# Patient Record
Sex: MALE | Race: WHITE | NOT HISPANIC OR LATINO | URBAN - METROPOLITAN AREA
[De-identification: names, ages, dates, MRNs, and addresses within clinical notes are randomized per-mention and may not be internally consistent; named-entity substitution may affect disease eponyms.]

---

## 2022-10-19 ENCOUNTER — LAB (OUTPATIENT)
Dept: LAB | Facility: HOSPITAL | Age: 67
End: 2022-10-19

## 2022-10-19 ENCOUNTER — OFFICE VISIT (OUTPATIENT)
Dept: NEUROLOGY | Facility: CLINIC | Age: 67
End: 2022-10-19

## 2022-10-19 VITALS
HEIGHT: 70 IN | TEMPERATURE: 96.9 F | OXYGEN SATURATION: 98 % | WEIGHT: 244 LBS | SYSTOLIC BLOOD PRESSURE: 118 MMHG | BODY MASS INDEX: 34.93 KG/M2 | HEART RATE: 78 BPM | DIASTOLIC BLOOD PRESSURE: 64 MMHG

## 2022-10-19 DIAGNOSIS — R25.1 TREMOR: Primary | ICD-10-CM

## 2022-10-19 DIAGNOSIS — R25.1 TREMOR: ICD-10-CM

## 2022-10-19 PROBLEM — K43.2 INCISIONAL HERNIA, WITHOUT OBSTRUCTION OR GANGRENE: Status: ACTIVE | Noted: 2020-05-22

## 2022-10-19 PROBLEM — R10.9 ABDOMINAL PAIN: Status: ACTIVE | Noted: 2019-01-31

## 2022-10-19 PROBLEM — K43.6 INCARCERATED VENTRAL HERNIA: Status: ACTIVE | Noted: 2019-12-03

## 2022-10-19 PROBLEM — E11.9 TYPE 2 DIABETES MELLITUS: Status: ACTIVE | Noted: 2019-12-03

## 2022-10-19 PROCEDURE — 86063 ANTISTREPTOLYSIN O SCREEN: CPT

## 2022-10-19 PROCEDURE — 82746 ASSAY OF FOLIC ACID SERUM: CPT

## 2022-10-19 PROCEDURE — 36415 COLL VENOUS BLD VENIPUNCTURE: CPT

## 2022-10-19 PROCEDURE — 80053 COMPREHEN METABOLIC PANEL: CPT

## 2022-10-19 PROCEDURE — 85025 COMPLETE CBC W/AUTO DIFF WBC: CPT

## 2022-10-19 PROCEDURE — 84436 ASSAY OF TOTAL THYROXINE: CPT

## 2022-10-19 PROCEDURE — 84446 ASSAY OF VITAMIN E: CPT

## 2022-10-19 PROCEDURE — 82607 VITAMIN B-12: CPT

## 2022-10-19 PROCEDURE — 99204 OFFICE O/P NEW MOD 45 MIN: CPT | Performed by: NURSE PRACTITIONER

## 2022-10-19 PROCEDURE — 82390 ASSAY OF CERULOPLASMIN: CPT

## 2022-10-19 PROCEDURE — 84443 ASSAY THYROID STIM HORMONE: CPT

## 2022-10-19 RX ORDER — ESCITALOPRAM OXALATE 20 MG/1
20 TABLET ORAL DAILY
COMMUNITY
Start: 2022-08-24 | End: 2023-01-20

## 2022-10-19 RX ORDER — DIAZEPAM 10 MG/1
TABLET ORAL
COMMUNITY
Start: 2022-08-11

## 2022-10-19 RX ORDER — ARIPIPRAZOLE 10 MG/1
10 TABLET ORAL DAILY
COMMUNITY
Start: 2022-10-18 | End: 2022-10-19 | Stop reason: DRUGHIGH

## 2022-10-19 RX ORDER — ARIPIPRAZOLE 5 MG/1
5 TABLET ORAL DAILY
Qty: 30 TABLET | Refills: 1 | Status: SHIPPED | OUTPATIENT
Start: 2022-10-19 | End: 2023-01-20

## 2022-10-19 NOTE — PROGRESS NOTES
Neuro Office Visit      Encounter Date: 10/19/2022   Patient Name: Kashmir Moura  : 1955   MRN: [id number]   PCP: Dr Almeida  Chief Complaint:    Chief Complaint   Patient presents with   • Tremors     NP- tremors in Bilateral hands picked since 2022       History of Present Illness: Kashmir Moura is a 67 y.o. male who is here today in Neurology for tremor.    Tremor  Has always had mild bilat hand tremor.  tremor became severe in bilat hands and mouth after increasing dose of Abiligy in . L>R. Right hand dominate.  He can stop the tremor. No tremor at night. Anxiety can make it worse. Drinks 6 diet cokes a day for many years.   Has been on Lexapro for years. Has been on abilify for 2 years. Abilfy dose was increased 6 months ago. Taking Abilify for anxiety. Takes vaium 2-3 times a week. Tremor does not improve with valium.    Poor sleep quality due to self-cath.  Has dreams but does not act out his dreams. No trouble rolling over in bed. No slurred speech or choking on food. Slightly unsteady on his feet. No falls. Has chronic back stiffness. No visual or auditory hallucinations. Wife denies change in face. Denies micrographia. Tremor is the same at rest and w activity. Denies numbness in left arm.  .    Meds: abilify, lexapro, valium, metformin    PMH: bladder ca, etoh abuse, anxiety, depression, htn, sz, hypothyroidism, prostate can  FH: neg for tremor, Neg for PD  Subjective      Past Medical History:   Past Medical History:   Diagnosis Date   • Allergies    • Anxiety    • Bladder cancer (HCC)    • Bladder problem    • Depression    • Seizures (HCC)        Past Surgical History:   Past Surgical History:   Procedure Laterality Date   • HERNIA REPAIR  2020 [facility]   • [place] P[place]      [place] Dr[facility]       Family History:   Family History   Problem Relation Age of Onset   • Arthritis Mother    • Lung cancer Father    • Heart  disease Father    • Hypertension Father        Social History:   Social History     Socioeconomic History   • Marital status:    Tobacco Use   • Smoking status: Former     Types: Cigarettes     Quit date: 2010     Years since quittin.8   • Smokeless tobacco: Never   Vaping Use   • Vaping Use: Never used   Substance and Sexual Activity   • Alcohol use: Yes     Comment: Very rarely   • Drug use: Yes     Types: Marijuana     Comment: daily   • Sexual activity: Yes     Partners: Female     Comment: Lives with wife, Shwetha       Medications:     Current Outpatient Medications:   •  diazePAM (VALIUM) 10 MG tablet, TAKE 1 TABLET BY MOUTH EVERY DAY AS NEEDED FOR MUSCLE SPASM, Disp: , Rfl:   •  escitalopram (LEXAPRO) 20 MG tablet, Take 1 tablet by mouth Daily., Disp: , Rfl:   •  metFORMIN (GLUCOPHAGE) 1000 MG tablet, Take 1 tablet by mouth Daily., Disp: , Rfl:   •  ARIPiprazole (ABILIFY) 5 MG tablet, Take 1 tablet by mouth Daily., Disp: 30 tablet, Rfl: 1    Allergies:   No Known Allergies    PHQ-9 Total Score:     STEADI Fall Risk Assessment was completed, and patient is at MODERATE risk for falls. Assessment completed on:10/19/2022    Objective     Physical Exam:   Physical Exam  Eyes:      Pupils: Pupils are equal, round, and reactive to light.   Neurological:      Mental Status: He is oriented to person, place, and time.      Coordination: Finger-Nose-Finger Test abnormal. Romberg Test normal.      Gait: Gait is intact.      Deep Tendon Reflexes:      Reflex Scores:       Tricep reflexes are 2+ on the right side and 2+ on the left side.       Bicep reflexes are 2+ on the right side and 2+ on the left side.       Brachioradialis reflexes are 2+ on the right side and 2+ on the left side.       Patellar reflexes are 2+ on the right side and 2+ on the left side.       Achilles reflexes are 2+ on the right side and 2+ on the left side.  Psychiatric:         Speech: Speech normal.         Neurologic Exam  "    Mental Status   Oriented to person, place, and time.   Follows 3 step commands.   Attention: normal. Concentration: normal.   Speech: speech is normal   Level of consciousness: alert  Knowledge: consistent with education.   Normal comprehension.     Cranial Nerves     CN III, IV, VI   Pupils are equal, round, and reactive to light.  Right pupil: Accommodation: intact.   Left pupil: Accommodation: intact.   CN III: no CN III palsy  CN VI: no CN VI palsy  Nystagmus: none   Diplopia: none  Upgaze: normal  Downgaze: normal  Conjugate gaze: present    CN VII   Facial expression full, symmetric.     CN VIII   Hearing: intact    CN XII   CN XII normal.     Motor Exam   Muscle bulk: normal  Overall muscle tone: normal    Strength   Right biceps: 5/5  Left biceps: 5/5  Right triceps: 5/5  Left triceps: 5/5  Right interossei: 5/5  Left interossei: 5/5  Right quadriceps: 5/5  Left quadriceps: 5/5  Right anterior tibial: 5/5  Left anterior tibial: 5/5  Right posterior tibial: 5/5  Left posterior tibial: 5/5    Sensory Exam   Light touch normal.     Gait, Coordination, and Reflexes     Gait  Gait: normal    Coordination   Romberg: negative  Finger to nose coordination: abnormal    Tremor   Resting tremor: present  Intention tremor: present  Action tremor: absent    Reflexes   Right brachioradialis: 2+  Left brachioradialis: 2+  Right biceps: 2+  Left biceps: 2+  Right triceps: 2+  Left triceps: 2+  Right patellar: 2+  Left patellar: 2+  Right achilles: 2+  Left achilles: 2+  Right : 2+  Left : 2+Left forearm with rotational tremor and and flexing of hand w thumb and extended fingers touching. Right forearm with minimal tremor.  FNF test neg on right and only slight dysarthria on left. Talat arm swing. Gait is not shuffling. No micrographia.        Vital Signs:   Vitals:    10/19/22 1322   BP: 118/64   Pulse: 78   Temp: 96.9 °F (36.1 °C)   SpO2: 98%   Weight: 111 kg (244 lb)   Height: 177.8 cm (70\")     Body mass index " is 35.01 kg/m².          Assessment / Plan      Assessment/Plan:   Diagnoses and all orders for this visit:    1. Tremor (Primary)  -     Antistreptolysin O Screen; Future  -     Ceruloplasmin; Future  -     Vitamin E; Future  -     TSH; Future  -     T4; Future  -     Vitamin B12 & Folate; Future  -     CBC Auto Differential; Future  -     Comprehensive Metabolic Panel; Future    Other orders  -     ARIPiprazole (ABILIFY) 5 MG tablet; Take 1 tablet by mouth Daily.  Dispense: 30 tablet; Refill: 1       Discussed differential to include BSE, PD and TD due to meds.  Will slowly wean him off of Lexapro and decrease Abilify to 5mg.  Wean off of caffeine. May need to use valium more often. He is to call me in 3 weeks. If still with tremor will start Sinemet.    Patient Education:       Reviewed medications, potential side effects and signs and symptoms to report. Discussed risk versus benefits of treatment plan with patient and/or family-including medications, labs and radiology that may be ordered. Addressed questions and concerns during visit. Patient and/or family verbalized understanding and agree with plan. Instructed to call the office with any questions and report to ER with any life-threatening symptoms.     Follow Up:   Return in about 3 months (around 1/19/2023) for Recheck.    During this visit the following were done:  Labs Reviewed []    Labs Ordered [x]    Radiology Reports Reviewed []    Radiology Ordered []    PCP Records Reviewed [x]    Referring Provider Records Reviewed []    ER Records Reviewed []    Hospital Records Reviewed []    History Obtained From Family [x]    Radiology Images Reviewed []    Other Reviewed []    Records Requested []      Fidel Rubio, NANCY, APRN

## 2022-10-20 LAB
ALBUMIN SERPL-MCNC: 4.2 G/DL (ref 3.5–5.2)
ALBUMIN/GLOB SERPL: 1.3 G/DL
ALP SERPL-CCNC: 82 U/L (ref 39–117)
ALT SERPL W P-5'-P-CCNC: 16 U/L (ref 1–41)
ANION GAP SERPL CALCULATED.3IONS-SCNC: 12.4 MMOL/L (ref 5–15)
ASO AB SERPL-ACNC: NEGATIVE [IU]/ML
AST SERPL-CCNC: 18 U/L (ref 1–40)
BASOPHILS # BLD AUTO: 0.05 10*3/MM3 (ref 0–0.2)
BASOPHILS NFR BLD AUTO: 0.6 % (ref 0–1.5)
BILIRUB SERPL-MCNC: 0.5 MG/DL (ref 0–1.2)
BUN SERPL-MCNC: 22 MG/DL (ref 8–23)
BUN/CREAT SERPL: 16.8 (ref 7–25)
CALCIUM SPEC-SCNC: 9.3 MG/DL (ref 8.6–10.5)
CERULOPLASMIN SERPL-MCNC: 26 MG/DL (ref 16–31)
CHLORIDE SERPL-SCNC: 101 MMOL/L (ref 98–107)
CO2 SERPL-SCNC: 22.6 MMOL/L (ref 22–29)
CREAT SERPL-MCNC: 1.31 MG/DL (ref 0.76–1.27)
DEPRECATED RDW RBC AUTO: 41.2 FL (ref 37–54)
EGFRCR SERPLBLD CKD-EPI 2021: 59.3 ML/MIN/1.73
EOSINOPHIL # BLD AUTO: 0.14 10*3/MM3 (ref 0–0.4)
EOSINOPHIL NFR BLD AUTO: 1.7 % (ref 0.3–6.2)
ERYTHROCYTE [DISTWIDTH] IN BLOOD BY AUTOMATED COUNT: 12.6 % (ref 12.3–15.4)
FOLATE SERPL-MCNC: 5.82 NG/ML (ref 4.78–24.2)
GLOBULIN UR ELPH-MCNC: 3.2 GM/DL
GLUCOSE SERPL-MCNC: 119 MG/DL (ref 65–99)
HCT VFR BLD AUTO: 43.6 % (ref 37.5–51)
HGB BLD-MCNC: 15.1 G/DL (ref 13–17.7)
IMM GRANULOCYTES # BLD AUTO: 0.04 10*3/MM3 (ref 0–0.05)
IMM GRANULOCYTES NFR BLD AUTO: 0.5 % (ref 0–0.5)
LYMPHOCYTES # BLD AUTO: 2.44 10*3/MM3 (ref 0.7–3.1)
LYMPHOCYTES NFR BLD AUTO: 29.6 % (ref 19.6–45.3)
MCH RBC QN AUTO: 30.9 PG (ref 26.6–33)
MCHC RBC AUTO-ENTMCNC: 34.6 G/DL (ref 31.5–35.7)
MCV RBC AUTO: 89.2 FL (ref 79–97)
MONOCYTES # BLD AUTO: 0.62 10*3/MM3 (ref 0.1–0.9)
MONOCYTES NFR BLD AUTO: 7.5 % (ref 5–12)
NEUTROPHILS NFR BLD AUTO: 4.96 10*3/MM3 (ref 1.7–7)
NEUTROPHILS NFR BLD AUTO: 60.1 % (ref 42.7–76)
NRBC BLD AUTO-RTO: 0 /100 WBC (ref 0–0.2)
PLATELET # BLD AUTO: 167 10*3/MM3 (ref 140–450)
PMV BLD AUTO: 11.9 FL (ref 6–12)
POTASSIUM SERPL-SCNC: 4.3 MMOL/L (ref 3.5–5.2)
PROT SERPL-MCNC: 7.4 G/DL (ref 6–8.5)
RBC # BLD AUTO: 4.89 10*6/MM3 (ref 4.14–5.8)
SODIUM SERPL-SCNC: 136 MMOL/L (ref 136–145)
T4 SERPL-MCNC: 5.65 MCG/DL (ref 4.5–11.7)
TSH SERPL DL<=0.05 MIU/L-ACNC: 3.09 UIU/ML (ref 0.27–4.2)
VIT B12 BLD-MCNC: 598 PG/ML (ref 211–946)
WBC NRBC COR # BLD: 8.25 10*3/MM3 (ref 3.4–10.8)

## 2022-10-27 ENCOUNTER — TELEPHONE (OUTPATIENT)
Dept: NEUROLOGY | Facility: CLINIC | Age: 67
End: 2022-10-27

## 2022-10-27 LAB
A-TOCOPHEROL VIT E SERPL-MCNC: 10.7 MG/L (ref 9–29)
GAMMA TOCOPHEROL SERPL-MCNC: 1.1 MG/L (ref 0.5–4.9)

## 2022-10-27 NOTE — TELEPHONE ENCOUNTER
Called patient and left  with results.    ----- Message from Fidel Rubio DNP, APRN sent at 10/27/2022 12:59 PM EDT -----  Please notify pt all labs were normal with no concerning findings.

## 2022-11-30 ENCOUNTER — TELEPHONE (OUTPATIENT)
Dept: NEUROLOGY | Facility: CLINIC | Age: 67
End: 2022-11-30

## 2022-11-30 NOTE — TELEPHONE ENCOUNTER
Caller: MURIEL  Relationship: SELF  Best call back number: [phone]    What medications are you currently taking:   Current Outpatient Medications on File Prior to Visit   Medication Sig Dispense Refill   • ARIPiprazole (ABILIFY) 5 MG tablet Take 1 tablet by mouth Daily. 30 tablet 1   • diazePAM (VALIUM) 10 MG tablet TAKE 1 TABLET BY MOUTH EVERY DAY AS NEEDED FOR MUSCLE SPASM     • escitalopram (LEXAPRO) 20 MG tablet Take 1 tablet by mouth Daily.     • metFORMIN (GLUCOPHAGE) 1000 MG tablet Take 1 tablet by mouth Daily.       No current facility-administered medications on file prior to visit.            Which medication are you concerned about: LEXAPRO AND ABILIFY    What are your concerns: HE STATES HIS TREMORS HAVE DECREASED AND HE HAS BEEN OFF BOTH MEDICATIONS FOR ABOUT A WEEK NOW. HE STATES EVERYTHING IS GOING FINE.

## 2023-01-09 ENCOUNTER — TELEPHONE (OUTPATIENT)
Dept: NEUROLOGY | Facility: CLINIC | Age: 68
End: 2023-01-09
Payer: MEDICARE

## 2023-01-09 NOTE — TELEPHONE ENCOUNTER
Caller: SASCHA OLMOS     Relationship: WIFE, ON  [employer]     Best call back number: [phone]  UNTIL 4:30 TODAY    What is your medical concern? PATIENT'S WIFE CALLED, STATES THAT THE PATIENT WAS WEANED OFF ANTI-DEPRESSION MEDICATION FOR TARDIVE DYSKINESIA, THE TREMORS STOPPED BUT THE PATIENT IS VERY DEPRESSED AND NEEDS TO BE ON ANTI DEPRESSION MEDICATION.    WHAT CAN THE PATIENT TAKE THAT WILL NOT CAUSE TARDIVE DYSKINESIA?    PLEASE CALL AND ADVISE    THANK YOU    How long has this issue been going on? AT LEAST 1 MONTH    Is your provider already aware of this issue? YES    Have you been treated for this issue? YES

## 2023-01-09 NOTE — TELEPHONE ENCOUNTER
His PCP or psychiatrist will need to manage his medication for depression. If he is suicidal he needs to go to ER.

## 2023-01-10 NOTE — TELEPHONE ENCOUNTER
Called and spoke with patient's wife Shwetha and gave provider's note.  Had a conversation telling her that we cannot treat for the depression and that would have to be followed by his PCP office which he had just been to yesterday as stated by Shwetha.  She will call them for treatment and he will keep his appointment here on the 20th.     There is 1 Wet Read(s) to document.

## 2023-01-20 ENCOUNTER — OFFICE VISIT (OUTPATIENT)
Dept: NEUROLOGY | Facility: CLINIC | Age: 68
End: 2023-01-20
Payer: MEDICARE

## 2023-01-20 VITALS
TEMPERATURE: 97.3 F | OXYGEN SATURATION: 98 % | HEIGHT: 70 IN | SYSTOLIC BLOOD PRESSURE: 122 MMHG | BODY MASS INDEX: 34.13 KG/M2 | HEART RATE: 91 BPM | DIASTOLIC BLOOD PRESSURE: 86 MMHG | WEIGHT: 238.4 LBS

## 2023-01-20 DIAGNOSIS — R25.1 TREMOR: ICD-10-CM

## 2023-01-20 DIAGNOSIS — G24.01 TARDIVE DYSKINESIA: Primary | ICD-10-CM

## 2023-01-20 PROCEDURE — 99213 OFFICE O/P EST LOW 20 MIN: CPT | Performed by: NURSE PRACTITIONER

## 2023-01-20 RX ORDER — BUPROPION HYDROCHLORIDE 150 MG/1
150 TABLET ORAL DAILY
COMMUNITY
Start: 2023-01-10

## 2023-01-20 NOTE — PROGRESS NOTES
Neuro Office Visit      Encounter Date: 2023   Patient Name: Kashmir Moura  : 1955   MRN: [id number]   PCP: DR Almeida  Chief Complaint:    Chief Complaint   Patient presents with   • Tremors       History of Present Illness: Kashmir Moura is a 67 y.o. male who is here today in Neurology for tremor.    Last visit 10/19/2022 w [facility],  Decrease Abilify 5 daily. Wean off of lexapro  Use valium prn.    Pt called with update and was doing well off of meds. Wife called back to report increased depression. Referred to psych to manage depression meds.    Tremor  Tremor resolved off of abilify and Lexapro. No further symptoms. Depression now treated w wellbutrin and he is stable.    PH  Has always had mild bilat hand tremor. 2022 tremor became severe in bilat hands and mouth after increasing dose of Abiligy in . L>R. Right hand dominate.  He can stop the tremor. No tremor at night. Anxiety can make it worse. Drinks 6 diet cokes a day for many years.   Has been on Lexapro for years. Has been on abilify for 2 years. Abilfy dose was increased 6 months ago. Taking Abilify for anxiety. Takes vaium 2-3 times a week. Tremor does not improve with valium.     Poor sleep quality due to self-cath.  Has dreams but does not act out his dreams. No trouble rolling over in bed. No slurred speech or choking on food. Slightly unsteady on his feet. No falls. Has chronic back stiffness. No visual or auditory hallucinations. Wife denies change in face. Denies micrographia. Tremor is the same at rest and w activity. Denies numbness in left arm.  .     Meds: abilify, lexapro, valium, metformin     PMH: bladder ca, etoh abuse, anxiety, depression, htn, sz, hypothyroidism, prostate can  FH: neg for tremor, Neg for PD  Subjective      Past Medical History:   Past Medical History:   Diagnosis Date   • Allergies    • Anxiety    • Bladder cancer (HCC)    • Bladder problem    • Depression    • Seizures (HCC)        Past  Surgical History:   Past Surgical History:   Procedure Laterality Date   • HERNIA REPAIR  2020 [facility]   • [place]      [place] Dr[facility]the[facility]       Family History:   Family History   Problem Relation Age of Onset   • Arthritis Mother    • Lung cancer Father    • Heart disease Father    • Hypertension Father        Social History:   Social History     Socioeconomic History   • Marital status:    Tobacco Use   • Smoking status: Former     Types: Cigarettes     Quit date: 2010     Years since quittin.1   • Smokeless tobacco: Never   Vaping Use   • Vaping Use: Never used   Substance and Sexual Activity   • Alcohol use: Yes     Comment: Very rarely   • Drug use: Yes     Types: Marijuana     Comment: daily   • Sexual activity: Yes     Partners: Female     Comment: Lives with wife, Shwetha       Medications:     Current Outpatient Medications:   •  diazePAM (VALIUM) 10 MG tablet, TAKE 1 TABLET BY MOUTH EVERY DAY AS NEEDED FOR MUSCLE SPASM, Disp: , Rfl:   •  metFORMIN (GLUCOPHAGE) 1000 MG tablet, Take 1 tablet by mouth Daily., Disp: , Rfl:   •  buPROPion XL (WELLBUTRIN XL) 150 MG 24 hr tablet, Take 150 mg by mouth Daily., Disp: , Rfl:     Allergies:   No Known Allergies    PHQ-9 Total Score:     STEADI Fall Risk Assessment was completed, and patient is at LOW risk for falls.Assessment completed on:2023    Objective     Physical Exam:   Physical Exam  Eyes:      Pupils: Pupils are equal, round, and reactive to light.   Neurological:      Mental Status: He is oriented to person, place, and time.      Gait: Gait is intact.   Psychiatric:         Speech: Speech normal.         Neurologic Exam     Mental Status   Oriented to person, place, and time.   Follows 3 step commands.   Attention: normal. Concentration: normal.   Speech: speech is normal   Level of consciousness: alert  Knowledge: consistent with education.   Normal comprehension.     Cranial  "Nerves     CN III, IV, VI   Pupils are equal, round, and reactive to light.  Right pupil: Accommodation: intact.   Left pupil: Accommodation: intact.   CN III: no CN III palsy  CN VI: no CN VI palsy  Nystagmus: none   Diplopia: none  Upgaze: normal  Downgaze: normal  Conjugate gaze: present    CN VII   Facial expression full, symmetric.     CN VIII   Hearing: intact    CN XII   CN XII normal.     Motor Exam   Muscle bulk: normal  Overall muscle tone: normal    Strength   Right biceps: 5/5  Left biceps: 5/5  Right triceps: 5/5  Left triceps: 5/5  Right interossei: 5/5  Left interossei: 5/5  Right quadriceps: 5/5  Left quadriceps: 5/5  Right anterior tibial: 5/5  Left anterior tibial: 5/5  Right posterior tibial: 5/5  Left posterior tibial: 5/5    Sensory Exam   Light touch normal.     Gait, Coordination, and Reflexes     Gait  Gait: normal    Tremor   Resting tremor: present  Action tremor: left arm and right armVery mild resting and action tremor that is int and pat is not aware.        Vital Signs:   Vitals:    01/20/23 1309   BP: 122/86   Pulse: 91   Temp: 97.3 °F (36.3 °C)   SpO2: 98%   Weight: 108 kg (238 lb 6.4 oz)   Height: 177.8 cm (70\")     Body mass index is 34.21 kg/m².       Assessment / Plan      Assessment/Plan:   Diagnoses and all orders for this visit:    1. Tardive dyskinesia (Primary)  Comments:  resolved.    2. Tremor  Comments:  Resolved off of lexapro and abilify.         Patient Education:     Reviewed medications, potential side effects and signs and symptoms to report. Discussed risk versus benefits of treatment plan with patient and/or family-including medications, labs and radiology that may be ordered. Addressed questions and concerns during visit. Patient and/or family verbalized understanding and agree with plan. Instructed to call the office with any questions and report to ER with any life-threatening symptoms.     Follow Up:   prn    During this visit the following were done:  Labs " Reviewed []    Labs Ordered []    Radiology Reports Reviewed []    Radiology Ordered []    PCP Records Reviewed []    Referring Provider Records Reviewed []    ER Records Reviewed []    Hospital Records Reviewed []    History Obtained From Family []    Radiology Images Reviewed []    Other Reviewed []    Records Requested []      Fidel Rubio, DNP, APRN

## 2025-03-26 NOTE — PROGRESS NOTES
RM:________    Referral Provider: Referring, Elder Baumann MD    NEW PATIENT/ CONSULT  PREVIOUS CARDIOLOGIST: ______________________________    CARDIAC TESTING: __________________________________________________    : 1955   AGE: 69 y.o.    2025  REASON FOR VISIT/  CC:      WT: ____________ BP: __________L __________R/ HR_______________    ALLERGIES:  Patient has no known allergies.  SMOKING HISTORY  Social History     Tobacco Use    Smoking status: Former     Current packs/day: 0.00     Types: Cigarettes     Quit date: 2010     Years since quittin.3    Smokeless tobacco: Never   Vaping Use    Vaping status: Never Used   Substance Use Topics    Alcohol use: Yes     Comment: Very rarely    Drug use: Yes     Types: Marijuana     Comment: daily          H/O: MI_____   STROKE________   GOUT_____   ANEMIA______     CAROTID________ HIV____ CAD_______ HYPERCHOL _____    H/O: CHF _____   RF____ DM___ HTN_______PVD____THYROID DISEASE_______    PMH: GI ____   HEPATITIS ___ KIDNEY DISEASE ___ LUNG DISEASE _______     SLEEP APNEA ____ BLOOD CLOTS ____ DVT ____ VEIN STRIPPING ___________      STOP BANG _________ (CARDIO ONCOLOGY ONLY)    CANCER _________________________________ CHEMO/ RADIATION__________

## 2025-03-28 ENCOUNTER — OFFICE VISIT (OUTPATIENT)
Dept: CARDIOLOGY | Age: 70
End: 2025-03-28
Payer: MEDICARE

## 2025-03-28 VITALS
WEIGHT: 238.4 LBS | HEIGHT: 70 IN | DIASTOLIC BLOOD PRESSURE: 88 MMHG | SYSTOLIC BLOOD PRESSURE: 138 MMHG | HEART RATE: 82 BPM | BODY MASS INDEX: 34.13 KG/M2

## 2025-03-28 DIAGNOSIS — R07.2 PRECORDIAL PAIN: Primary | ICD-10-CM

## 2025-03-28 DIAGNOSIS — R06.09 DYSPNEA ON EXERTION: ICD-10-CM

## 2025-03-28 DIAGNOSIS — I45.2 BIFASCICULAR BLOCK: ICD-10-CM

## 2025-03-28 DIAGNOSIS — Z82.49 FAMILY HISTORY OF CORONARY ARTERIOSCLEROSIS: ICD-10-CM

## 2025-03-28 DIAGNOSIS — E11.9 TYPE 2 DIABETES MELLITUS WITHOUT COMPLICATION, WITHOUT LONG-TERM CURRENT USE OF INSULIN: ICD-10-CM

## 2025-03-28 PROBLEM — R10.9 ABDOMINAL PAIN: Status: RESOLVED | Noted: 2019-01-31 | Resolved: 2025-03-28

## 2025-03-28 PROBLEM — K43.2 INCISIONAL HERNIA, WITHOUT OBSTRUCTION OR GANGRENE: Status: RESOLVED | Noted: 2020-05-22 | Resolved: 2025-03-28

## 2025-03-28 PROBLEM — K43.6 INCARCERATED VENTRAL HERNIA: Status: RESOLVED | Noted: 2019-12-03 | Resolved: 2025-03-28

## 2025-03-28 RX ORDER — ATENOLOL 25 MG/1
25 TABLET ORAL DAILY
Qty: 90 TABLET | Refills: 1 | Status: SHIPPED | OUTPATIENT
Start: 2025-03-28

## 2025-03-28 RX ORDER — ASPIRIN 81 MG/1
81 TABLET ORAL DAILY
Start: 2025-03-28

## 2025-03-28 NOTE — PROGRESS NOTES
"Date of Office Visit: 25  Encounter Provider: Kemal Guevara MD  Place of Service: [facility] CARDIOLOGY  Patient Name: Kashmir Moura  :1955    Chief Complaint   Patient presents with    Irregular Heart Beat    Chest Pain   :     HPI:     Mr. Moura is 69 y.o. and presents today for evaluation of chest pain.    He has a history of bladder cancer s/p resection and [place] pouch. He does not have a history of HTN. His last lipid panel showed an HDL of 52, , . His last A1C was 6.5%; he was started on metformin. His father had premature CAD. He had a CT in 2024 that reported coronary artery calcification, but did not quantify them. I cannot see those images as they were performed at [facility].    He is not terribly physically active. He does report fatigue and dyspnea with even mild exertion. Six weeks ago, shortly after waking up, he developed intense substernal chest pressure that was non-radiating. He was very short of breath and panicked. He was very nauseated and actually threw up twice; his pain improved after vomiting. The pain lasted ~ one hour. He did not take any medications at home like aspirin. He called EMS and they evaluated him and did an EKG; they did not administer any medications either. Once his pain subsided, they told him (in his words), \"you're not having a heart attack, we could take you to the hospital, but they're just going to do some labs and send you home.\"  He had a similar episode of pain two weeks later, although it was shorter and nowhere near as severe. He has not had any pain since then.     Past Medical History:   Diagnosis Date    Anxiety     Depression     Incarcerated ventral hernia 2019    Malignant neoplasm of bladder 2014    Formatting of this note might be different from the original.  2015 IMO UPDATE      Seizures     Type 2 diabetes mellitus        Past Surgical History:   Procedure Laterality Date " "   HERNIA REPAIR  2020 [facility]    [place] POUCH  2012    [place] [facility]       Social History     Socioeconomic History    Marital status:    Tobacco Use    Smoking status: Former     Current packs/day: 0.00     Types: Cigarettes     Quit date: 2010     Years since quittin.3     Passive exposure: Never    Smokeless tobacco: Never   Vaping Use    Vaping status: Never Used   Substance and Sexual Activity    Alcohol use: Yes     Alcohol/week: 10.0 standard drinks of alcohol     Types: 10 Shots of liquor per week    Drug use: Yes     Types: Marijuana     Comment: twice weekly    Sexual activity: Yes     Partners: Female     Comment: Lives with wife, Shwetha       Family History   Problem Relation Age of Onset    Arthritis Mother     Lung cancer Father     Heart disease Father     Hypertension Father        Review of Systems   Constitutional: Positive for malaise/fatigue.   Cardiovascular:  Positive for chest pain and dyspnea on exertion.       No Known Allergies      Current Outpatient Medications:     buPROPion XL (WELLBUTRIN XL) 150 MG 24 hr tablet, Take 150 mg by mouth Daily. (Patient taking differently: Take 2 tablets by mouth Daily.), Disp: , Rfl:     diazePAM (VALIUM) 10 MG tablet, TAKE 1 TABLET BY MOUTH EVERY DAY AS NEEDED FOR MUSCLE SPASM, Disp: , Rfl:     metFORMIN (GLUCOPHAGE) 1000 MG tablet, Take 1 tablet by mouth Daily., Disp: , Rfl:     aspirin 81 MG EC tablet, Take 1 tablet by mouth Daily., Disp: , Rfl:     atenolol (TENORMIN) 25 MG tablet, Take 1 tablet by mouth Daily., Disp: 90 tablet, Rfl: 1      Objective:     Vitals:    25 1000   BP: 138/88   BP Location: Left arm   Pulse: 82   Weight: 108 kg (238 lb 6.4 oz)   Height: 177.8 cm (70\")     Body mass index is 34.21 kg/m².    Vitals reviewed.   Constitutional:       Appearance: Not in distress.      Comments: Obese   Eyes:      Conjunctiva/sclera: Conjunctivae normal.   HENT:      " Head: Normocephalic.      Nose: Nose normal.   Neck:      Vascular: JVD normal.   Pulmonary:      Effort: Pulmonary effort is normal.      Breath sounds: Normal breath sounds.   Cardiovascular:      Normal rate. Regular rhythm.      Murmurs: There is no murmur.   Pulses:     Intact distal pulses.   Edema:     Peripheral edema absent.   Abdominal:      Palpations: Abdomen is soft.      Tenderness: There is no abdominal tenderness.      Comments: Obesity limits abdominal exam   Musculoskeletal: Normal range of motion.      Cervical back: Normal range of motion. Skin:     General: Skin is warm and dry.   Neurological:      Mental Status: Alert and oriented to person, place, and time.      Cranial Nerves: No cranial nerve deficit.   Psychiatric:         Behavior: Behavior normal.         Thought Content: Thought content normal.         Judgment: Judgment normal.           ECG 12 Lead    Date/Time: 3/28/2025 4:18 PM  Performed by: Kemal Guevara MD    Authorized by: Kemal Guevara MD  Comparison: not compared with previous ECG   Previous ECG: no previous ECG available  Rhythm: sinus rhythm  Rate: normal  Conduction: right bundle branch block and left anterior fascicular block  QRS axis: left  Other: no other findings    Clinical impression: abnormal EKG          Assessment:       Diagnosis Plan   1. Precordial pain  Stress Test With PET Myocardial Perfusion    Adult Transthoracic Echo Complete W/ Cont if Necessary Per Protocol      2. Dyspnea on exertion  Stress Test With PET Myocardial Perfusion    Adult Transthoracic Echo Complete W/ Cont if Necessary Per Protocol      3. Bifascicular block  Stress Test With PET Myocardial Perfusion    Adult Transthoracic Echo Complete W/ Cont if Necessary Per Protocol      4. Type 2 diabetes mellitus without complication, without long-term current use of insulin        5. Family history of coronary arteriosclerosis          Plan:       Mr Moura is a 68yo man with several risk factors  for CAD (age, male sex, family history, former cigarette smoking, hypertriglyceridemia, T2DM, known coronary artery calcification) who had an episode of chest discomfort several weeks ago followed by a milder episode that I am concerned was actually an infarct. While it may have been something innocuous like an esophageal spasm or an atypical representation of biliary colic, my primary concern lies with his heart.    He has a bifascicular block on EKG; I have no EKGs to review but I can find a old study in Epic that reported a LAFB. The RBBB is new since 2020 but I'm not sure when it developed. He denies any symptoms of advanced conduction disease.    I have recommended an echo and a perfusion stress test for starters. While we are completing his workup, I asked him to take low dose aspirin (81mg daily) and atenolol 25mg daily (as an anti-anginal and to help with his mildly elevated BP).       Sincerely,       Kemal Guevara MD

## 2025-03-28 NOTE — H&P (VIEW-ONLY)
"Date of Office Visit: 25  Encounter Provider: Kemal Guevara MD  Place of Service: [facility] CARDIOLOGY  Patient Name: Kashmir Moura  :1955    Chief Complaint   Patient presents with    Irregular Heart Beat    Chest Pain   :     HPI:     Mr. Moura is 69 y.o. and presents today for evaluation of chest pain.    He has a history of bladder cancer s/p resection and [place] pouch. He does not have a history of HTN. His last lipid panel showed an HDL of 52, , . His last A1C was 6.5%; he was started on metformin. His father had premature CAD. He had a CT in 2024 that reported coronary artery calcification, but did not quantify them. I cannot see those images as they were performed at [facility].    He is not terribly physically active. He does report fatigue and dyspnea with even mild exertion. Six weeks ago, shortly after waking up, he developed intense substernal chest pressure that was non-radiating. He was very short of breath and panicked. He was very nauseated and actually threw up twice; his pain improved after vomiting. The pain lasted ~ one hour. He did not take any medications at home like aspirin. He called EMS and they evaluated him and did an EKG; they did not administer any medications either. Once his pain subsided, they told him (in his words), \"you're not having a heart attack, we could take you to the hospital, but they're just going to do some labs and send you home.\"  He had a similar episode of pain two weeks later, although it was shorter and nowhere near as severe. He has not had any pain since then.     Past Medical History:   Diagnosis Date    Anxiety     Depression     Incarcerated ventral hernia 2019    Malignant neoplasm of bladder 2014    Formatting of this note might be different from the original.  2015 IMO UPDATE      Seizures     Type 2 diabetes mellitus        Past Surgical History:   Procedure Laterality Date " "   HERNIA REPAIR  2020 [facility]    [place] POUCH  2012    [place] [facility]       Social History     Socioeconomic History    Marital status:    Tobacco Use    Smoking status: Former     Current packs/day: 0.00     Types: Cigarettes     Quit date: 2010     Years since quittin.3     Passive exposure: Never    Smokeless tobacco: Never   Vaping Use    Vaping status: Never Used   Substance and Sexual Activity    Alcohol use: Yes     Alcohol/week: 10.0 standard drinks of alcohol     Types: 10 Shots of liquor per week    Drug use: Yes     Types: Marijuana     Comment: twice weekly    Sexual activity: Yes     Partners: Female     Comment: Lives with wife, Shwetha       Family History   Problem Relation Age of Onset    Arthritis Mother     Lung cancer Father     Heart disease Father     Hypertension Father        Review of Systems   Constitutional: Positive for malaise/fatigue.   Cardiovascular:  Positive for chest pain and dyspnea on exertion.       No Known Allergies      Current Outpatient Medications:     buPROPion XL (WELLBUTRIN XL) 150 MG 24 hr tablet, Take 150 mg by mouth Daily. (Patient taking differently: Take 2 tablets by mouth Daily.), Disp: , Rfl:     diazePAM (VALIUM) 10 MG tablet, TAKE 1 TABLET BY MOUTH EVERY DAY AS NEEDED FOR MUSCLE SPASM, Disp: , Rfl:     metFORMIN (GLUCOPHAGE) 1000 MG tablet, Take 1 tablet by mouth Daily., Disp: , Rfl:     aspirin 81 MG EC tablet, Take 1 tablet by mouth Daily., Disp: , Rfl:     atenolol (TENORMIN) 25 MG tablet, Take 1 tablet by mouth Daily., Disp: 90 tablet, Rfl: 1      Objective:     Vitals:    25 1000   BP: 138/88   BP Location: Left arm   Pulse: 82   Weight: 108 kg (238 lb 6.4 oz)   Height: 177.8 cm (70\")     Body mass index is 34.21 kg/m².    Vitals reviewed.   Constitutional:       Appearance: Not in distress.      Comments: Obese   Eyes:      Conjunctiva/sclera: Conjunctivae normal.   HENT:      " Head: Normocephalic.      Nose: Nose normal.   Neck:      Vascular: JVD normal.   Pulmonary:      Effort: Pulmonary effort is normal.      Breath sounds: Normal breath sounds.   Cardiovascular:      Normal rate. Regular rhythm.      Murmurs: There is no murmur.   Pulses:     Intact distal pulses.   Edema:     Peripheral edema absent.   Abdominal:      Palpations: Abdomen is soft.      Tenderness: There is no abdominal tenderness.      Comments: Obesity limits abdominal exam   Musculoskeletal: Normal range of motion.      Cervical back: Normal range of motion. Skin:     General: Skin is warm and dry.   Neurological:      Mental Status: Alert and oriented to person, place, and time.      Cranial Nerves: No cranial nerve deficit.   Psychiatric:         Behavior: Behavior normal.         Thought Content: Thought content normal.         Judgment: Judgment normal.           ECG 12 Lead    Date/Time: 3/28/2025 4:18 PM  Performed by: Kemal Guevara MD    Authorized by: Kemal Guevara MD  Comparison: not compared with previous ECG   Previous ECG: no previous ECG available  Rhythm: sinus rhythm  Rate: normal  Conduction: right bundle branch block and left anterior fascicular block  QRS axis: left  Other: no other findings    Clinical impression: abnormal EKG          Assessment:       Diagnosis Plan   1. Precordial pain  Stress Test With PET Myocardial Perfusion    Adult Transthoracic Echo Complete W/ Cont if Necessary Per Protocol      2. Dyspnea on exertion  Stress Test With PET Myocardial Perfusion    Adult Transthoracic Echo Complete W/ Cont if Necessary Per Protocol      3. Bifascicular block  Stress Test With PET Myocardial Perfusion    Adult Transthoracic Echo Complete W/ Cont if Necessary Per Protocol      4. Type 2 diabetes mellitus without complication, without long-term current use of insulin        5. Family history of coronary arteriosclerosis          Plan:       Mr Moura is a 70yo man with several risk factors  for CAD (age, male sex, family history, former cigarette smoking, hypertriglyceridemia, T2DM, known coronary artery calcification) who had an episode of chest discomfort several weeks ago followed by a milder episode that I am concerned was actually an infarct. While it may have been something innocuous like an esophageal spasm or an atypical representation of biliary colic, my primary concern lies with his heart.    He has a bifascicular block on EKG; I have no EKGs to review but I can find a old study in Epic that reported a LAFB. The RBBB is new since 2020 but I'm not sure when it developed. He denies any symptoms of advanced conduction disease.    I have recommended an echo and a perfusion stress test for starters. While we are completing his workup, I asked him to take low dose aspirin (81mg daily) and atenolol 25mg daily (as an anti-anginal and to help with his mildly elevated BP).       Sincerely,       Kemal Guevara MD

## 2025-04-03 ENCOUNTER — TELEPHONE (OUTPATIENT)
Dept: CARDIOLOGY | Age: 70
End: 2025-04-03
Payer: MEDICARE

## 2025-04-04 ENCOUNTER — HOSPITAL ENCOUNTER (OUTPATIENT)
Dept: CARDIOLOGY | Facility: HOSPITAL | Age: 70
Discharge: HOME | End: 2025-04-04
Payer: MEDICARE

## 2025-04-04 ENCOUNTER — RESULTS FOLLOW-UP (OUTPATIENT)
Dept: CARDIOLOGY | Age: 70
End: 2025-04-04

## 2025-04-04 VITALS
DIASTOLIC BLOOD PRESSURE: 79 MMHG | WEIGHT: 238 LBS | BODY MASS INDEX: 34.07 KG/M2 | OXYGEN SATURATION: 97 % | SYSTOLIC BLOOD PRESSURE: 143 MMHG | HEART RATE: 70 BPM | HEIGHT: 70 IN

## 2025-04-04 VITALS — BODY MASS INDEX: 34.09 KG/M2 | WEIGHT: 238.1 LBS | HEIGHT: 70 IN

## 2025-04-04 DIAGNOSIS — R06.09 DYSPNEA ON EXERTION: ICD-10-CM

## 2025-04-04 DIAGNOSIS — R94.39 ABNORMAL NUCLEAR STRESS TEST: ICD-10-CM

## 2025-04-04 DIAGNOSIS — R07.2 PRECORDIAL PAIN: ICD-10-CM

## 2025-04-04 DIAGNOSIS — I45.2 BIFASCICULAR BLOCK: ICD-10-CM

## 2025-04-04 DIAGNOSIS — R07.2 PRECORDIAL PAIN: Primary | ICD-10-CM

## 2025-04-04 LAB
AORTIC ARCH: 3.1 CM
AORTIC DIMENSIONLESS INDEX: 0.77 (DI)
ASCENDING AORTA: 3.2 CM
AV MEAN PRESS GRAD SYS DOP V1V2: 3 MMHG
AV VMAX SYS DOP: 110 CM/SEC
BH CV ECHO MEAS - ACS: 2.06 CM
BH CV ECHO MEAS - AI P1/2T: 753.5 MSEC
BH CV ECHO MEAS - AO MAX PG: 4.8 MMHG
BH CV ECHO MEAS - AO ROOT AREA (BSA CORRECTED): 1.4 CM2
BH CV ECHO MEAS - AO ROOT DIAM: 3.2 CM
BH CV ECHO MEAS - AO V2 VTI: 22.8 CM
BH CV ECHO MEAS - AVA(I,D): 2.6 CM2
BH CV ECHO MEAS - EDV(CUBED): 85.2 ML
BH CV ECHO MEAS - EDV(MOD-SP2): 105 ML
BH CV ECHO MEAS - EDV(MOD-SP4): 103 ML
BH CV ECHO MEAS - EF(MOD-SP2): 61 %
BH CV ECHO MEAS - EF(MOD-SP4): 67 %
BH CV ECHO MEAS - ESV(CUBED): 27.8 ML
BH CV ECHO MEAS - ESV(MOD-SP2): 41 ML
BH CV ECHO MEAS - ESV(MOD-SP4): 34 ML
BH CV ECHO MEAS - FS: 31.1 %
BH CV ECHO MEAS - IVS/LVPW: 1.08 CM
BH CV ECHO MEAS - IVSD: 1.3 CM
BH CV ECHO MEAS - LAT PEAK E' VEL: 7.4 CM/SEC
BH CV ECHO MEAS - LV DIASTOLIC VOL/BSA (35-75): 45.8 CM2
BH CV ECHO MEAS - LV MASS(C)D: 203 GRAMS
BH CV ECHO MEAS - LV MAX PG: 3.1 MMHG
BH CV ECHO MEAS - LV MEAN PG: 2 MMHG
BH CV ECHO MEAS - LV SYSTOLIC VOL/BSA (12-30): 15.1 CM2
BH CV ECHO MEAS - LV V1 MAX: 88 CM/SEC
BH CV ECHO MEAS - LV V1 VTI: 17.5 CM
BH CV ECHO MEAS - LVIDD: 4.4 CM
BH CV ECHO MEAS - LVIDS: 3 CM
BH CV ECHO MEAS - LVOT AREA: 3.4 CM2
BH CV ECHO MEAS - LVOT DIAM: 2.07 CM
BH CV ECHO MEAS - LVPWD: 1.2 CM
BH CV ECHO MEAS - MED PEAK E' VEL: 5.3 CM/SEC
BH CV ECHO MEAS - MV A DUR: 0.11 SEC
BH CV ECHO MEAS - MV A MAX VEL: 89.7 CM/SEC
BH CV ECHO MEAS - MV DEC SLOPE: 251.1 CM/SEC2
BH CV ECHO MEAS - MV DEC TIME: 0.17 SEC
BH CV ECHO MEAS - MV E MAX VEL: 51 CM/SEC
BH CV ECHO MEAS - MV E/A: 0.57
BH CV ECHO MEAS - MV MAX PG: 3.7 MMHG
BH CV ECHO MEAS - MV MEAN PG: 1.23 MMHG
BH CV ECHO MEAS - MV P1/2T: 74.8 MSEC
BH CV ECHO MEAS - MV V2 VTI: 23.8 CM
BH CV ECHO MEAS - MVA(P1/2T): 2.9 CM2
BH CV ECHO MEAS - MVA(VTI): 2.47 CM2
BH CV ECHO MEAS - PA ACC TIME: 0.1 SEC
BH CV ECHO MEAS - PA V2 MAX: 77.9 CM/SEC
BH CV ECHO MEAS - PULM A REVS DUR: 0.11 SEC
BH CV ECHO MEAS - PULM A REVS VEL: 36 CM/SEC
BH CV ECHO MEAS - PULM DIAS VEL: 38.8 CM/SEC
BH CV ECHO MEAS - PULM S/D: 1.4
BH CV ECHO MEAS - PULM SYS VEL: 54.3 CM/SEC
BH CV ECHO MEAS - QP/QS: 1.69
BH CV ECHO MEAS - RAP SYSTOLE: 3 MMHG
BH CV ECHO MEAS - RV MAX PG: 2.47 MMHG
BH CV ECHO MEAS - RV V1 MAX: 78.6 CM/SEC
BH CV ECHO MEAS - RV V1 VTI: 17.9 CM
BH CV ECHO MEAS - RVOT DIAM: 2.7 CM
BH CV ECHO MEAS - RVSP: 11.3 MMHG
BH CV ECHO MEAS - SV(LVOT): 58.9 ML
BH CV ECHO MEAS - SV(MOD-SP2): 64 ML
BH CV ECHO MEAS - SV(MOD-SP4): 69 ML
BH CV ECHO MEAS - SV(RVOT): 99.4 ML
BH CV ECHO MEAS - SVI(LVOT): 26.2 ML/M2
BH CV ECHO MEAS - SVI(MOD-SP2): 28.5 ML/M2
BH CV ECHO MEAS - SVI(MOD-SP4): 30.7 ML/M2
BH CV ECHO MEAS - TAPSE (>1.6): 2.15 CM
BH CV ECHO MEAS - TR MAX PG: 8.3 MMHG
BH CV ECHO MEAS - TR MAX VEL: 144.4 CM/SEC
BH CV ECHO MEASUREMENTS AVERAGE E/E' RATIO: 8.03
BH CV NUCLEAR PRIOR STUDY: 2
BH CV REST NUCLEAR ISOTOPE DOSE: 28.1 MCI
BH CV STRESS COMMENTS STAGE 1: NORMAL
BH CV STRESS DOSE REGADENOSON STAGE 1: 0.4
BH CV STRESS DURATION MIN STAGE 1: 0
BH CV STRESS DURATION SEC STAGE 1: 10
BH CV STRESS NUCLEAR ISOTOPE DOSE: 28.1 MCI
BH CV STRESS PROTOCOL 1: NORMAL
BH CV STRESS RECOVERY BP: NORMAL MMHG
BH CV STRESS RECOVERY HR: 71 BPM
BH CV STRESS STAGE 1: 1
BH CV XLRA - RV BASE: 3.3 CM
BH CV XLRA - RV LENGTH: 7.4 CM
BH CV XLRA - RV MID: 3.1 CM
BH CV XLRA - TDI S': 8.7 CM/SEC
LEFT ATRIUM VOLUME INDEX: 14.5 ML/M2
LV EF BIPLANE MOD: 63.9 %
MAXIMAL PREDICTED HEART RATE: 150 BPM
PERCENT MAX PREDICTED HR: 48 %
SINUS: 3.2 CM
SPECT HRT GATED+EF W RNC IV: 56 %
STJ: 2.7 CM
STRESS BASELINE BP: NORMAL MMHG
STRESS BASELINE HR: 67 BPM
STRESS O2 SAT REST: 93 %
STRESS PERCENT HR: 56 %
STRESS POST O2 SAT PEAK: 93 %
STRESS POST PEAK BP: NORMAL MMHG
STRESS POST PEAK HR: 72 BPM
STRESS TARGET HR: 128 BPM

## 2025-04-04 PROCEDURE — 93306 TTE W/DOPPLER COMPLETE: CPT

## 2025-04-04 PROCEDURE — A9555 RB82 RUBIDIUM: HCPCS | Performed by: INTERNAL MEDICINE

## 2025-04-04 PROCEDURE — 93017 CV STRESS TEST TRACING ONLY: CPT

## 2025-04-04 PROCEDURE — 25510000001 PERFLUTREN 6.52 MG/ML SUSPENSION 2 ML VIAL: Performed by: INTERNAL MEDICINE

## 2025-04-04 PROCEDURE — 78492 MYOCRD IMG PET MLT RST&STRS: CPT

## 2025-04-04 PROCEDURE — 25010000002 REGADENOSON 0.4 MG/5ML SOLUTION: Performed by: INTERNAL MEDICINE

## 2025-04-04 PROCEDURE — 34310000005 RUBIDIUM CHLORIDE: Performed by: INTERNAL MEDICINE

## 2025-04-04 RX ORDER — REGADENOSON 0.08 MG/ML
0.4 INJECTION, SOLUTION INTRAVENOUS
Status: COMPLETED | OUTPATIENT
Start: 2025-04-04 | End: 2025-04-04

## 2025-04-04 RX ADMIN — REGADENOSON 0.4 MG: 0.08 INJECTION, SOLUTION INTRAVENOUS at 13:35

## 2025-04-04 RX ADMIN — PERFLUTREN 2 ML: 6.52 INJECTION, SUSPENSION INTRAVENOUS at 12:27

## 2025-04-04 NOTE — TELEPHONE ENCOUNTER
Doxazosin 2 mg 4 tablets  A day 90 day 260#   I called and s/w wife and patient. Normal echo but abnormal stress. Despite low risk findings, he's a high risk patient and has ongoing symptoms. I think a cath is in order. They agree.

## 2025-04-07 ENCOUNTER — TRANSCRIBE ORDERS (OUTPATIENT)
Dept: CARDIOLOGY | Age: 70
End: 2025-04-07
Payer: MEDICARE

## 2025-04-07 DIAGNOSIS — Z01.810 PRE-OPERATIVE CARDIOVASCULAR EXAMINATION: ICD-10-CM

## 2025-04-07 DIAGNOSIS — Z13.6 SCREENING FOR ISCHEMIC HEART DISEASE: Primary | ICD-10-CM

## 2025-04-09 ENCOUNTER — LAB (OUTPATIENT)
Dept: LAB | Facility: HOSPITAL | Age: 70
End: 2025-04-09
Payer: MEDICARE

## 2025-04-09 ENCOUNTER — TELEPHONE (OUTPATIENT)
Dept: CARDIOLOGY | Age: 70
End: 2025-04-09
Payer: MEDICARE

## 2025-04-09 DIAGNOSIS — Z13.6 SCREENING FOR ISCHEMIC HEART DISEASE: ICD-10-CM

## 2025-04-09 DIAGNOSIS — Z01.810 PRE-OPERATIVE CARDIOVASCULAR EXAMINATION: ICD-10-CM

## 2025-04-09 LAB
ANION GAP SERPL CALCULATED.3IONS-SCNC: 9.2 MMOL/L (ref 5–15)
BASOPHILS # BLD AUTO: 0.06 10*3/MM3 (ref 0–0.2)
BASOPHILS NFR BLD AUTO: 0.7 % (ref 0–1.5)
BUN SERPL-MCNC: 29 MG/DL (ref 8–23)
BUN/CREAT SERPL: 23.2 (ref 7–25)
CALCIUM SPEC-SCNC: 9 MG/DL (ref 8.6–10.5)
CHLORIDE SERPL-SCNC: 108 MMOL/L (ref 98–107)
CO2 SERPL-SCNC: 18.8 MMOL/L (ref 22–29)
CREAT SERPL-MCNC: 1.25 MG/DL (ref 0.76–1.27)
DEPRECATED RDW RBC AUTO: 44.8 FL (ref 37–54)
EGFRCR SERPLBLD CKD-EPI 2021: 61.9 ML/MIN/1.73
EOSINOPHIL # BLD AUTO: 0.2 10*3/MM3 (ref 0–0.4)
EOSINOPHIL NFR BLD AUTO: 2.4 % (ref 0.3–6.2)
ERYTHROCYTE [DISTWIDTH] IN BLOOD BY AUTOMATED COUNT: 12.2 % (ref 12.3–15.4)
GLUCOSE SERPL-MCNC: 159 MG/DL (ref 65–99)
HCT VFR BLD AUTO: 44.8 % (ref 37.5–51)
HGB BLD-MCNC: 15.5 G/DL (ref 13–17.7)
IMM GRANULOCYTES # BLD AUTO: 0.04 10*3/MM3 (ref 0–0.05)
IMM GRANULOCYTES NFR BLD AUTO: 0.5 % (ref 0–0.5)
LYMPHOCYTES # BLD AUTO: 1.79 10*3/MM3 (ref 0.7–3.1)
LYMPHOCYTES NFR BLD AUTO: 21.8 % (ref 19.6–45.3)
MCH RBC QN AUTO: 34.4 PG (ref 26.6–33)
MCHC RBC AUTO-ENTMCNC: 34.6 G/DL (ref 31.5–35.7)
MCV RBC AUTO: 99.3 FL (ref 79–97)
MONOCYTES # BLD AUTO: 0.81 10*3/MM3 (ref 0.1–0.9)
MONOCYTES NFR BLD AUTO: 9.9 % (ref 5–12)
NEUTROPHILS NFR BLD AUTO: 5.31 10*3/MM3 (ref 1.7–7)
NEUTROPHILS NFR BLD AUTO: 64.7 % (ref 42.7–76)
NRBC BLD AUTO-RTO: 0 /100 WBC (ref 0–0.2)
PLATELET # BLD AUTO: 170 10*3/MM3 (ref 140–450)
PMV BLD AUTO: 11.2 FL (ref 6–12)
POTASSIUM SERPL-SCNC: 4.5 MMOL/L (ref 3.5–5.2)
RBC # BLD AUTO: 4.51 10*6/MM3 (ref 4.14–5.8)
SODIUM SERPL-SCNC: 136 MMOL/L (ref 136–145)
WBC NRBC COR # BLD AUTO: 8.21 10*3/MM3 (ref 3.4–10.8)

## 2025-04-09 PROCEDURE — 80048 BASIC METABOLIC PNL TOTAL CA: CPT

## 2025-04-09 PROCEDURE — 36415 COLL VENOUS BLD VENIPUNCTURE: CPT

## 2025-04-09 PROCEDURE — 85025 COMPLETE CBC W/AUTO DIFF WBC: CPT

## 2025-04-11 ENCOUNTER — HOSPITAL ENCOUNTER (OUTPATIENT)
Facility: HOSPITAL | Age: 70
Setting detail: OBSERVATION
Discharge: HOME | End: 2025-04-12
Attending: INTERNAL MEDICINE | Admitting: INTERNAL MEDICINE
Payer: MEDICARE

## 2025-04-11 DIAGNOSIS — R06.09 DYSPNEA ON EXERTION: ICD-10-CM

## 2025-04-11 DIAGNOSIS — R07.2 PRECORDIAL PAIN: ICD-10-CM

## 2025-04-11 DIAGNOSIS — R94.39 ABNORMAL NUCLEAR STRESS TEST: ICD-10-CM

## 2025-04-11 DIAGNOSIS — Z95.5 S/P DRUG ELUTING CORONARY STENT PLACEMENT: Primary | ICD-10-CM

## 2025-04-11 PROBLEM — R07.9 CHEST PAIN: Status: ACTIVE | Noted: 2025-04-11

## 2025-04-11 LAB
GLUCOSE BLDC GLUCOMTR-MCNC: 131 MG/DL (ref 70–130)
GLUCOSE BLDC GLUCOMTR-MCNC: 149 MG/DL (ref 70–130)
GLUCOSE BLDC GLUCOMTR-MCNC: 156 MG/DL (ref 70–130)

## 2025-04-11 PROCEDURE — 63710000001 ATENOLOL 25 MG TABLET: Performed by: INTERNAL MEDICINE

## 2025-04-11 PROCEDURE — C1874 STENT, COATED/COV W/DEL SYS: HCPCS | Performed by: INTERNAL MEDICINE

## 2025-04-11 PROCEDURE — A9270 NON-COVERED ITEM OR SERVICE: HCPCS | Performed by: INTERNAL MEDICINE

## 2025-04-11 PROCEDURE — 93005 ELECTROCARDIOGRAM TRACING: CPT | Performed by: INTERNAL MEDICINE

## 2025-04-11 PROCEDURE — C1887 CATHETER, GUIDING: HCPCS | Performed by: INTERNAL MEDICINE

## 2025-04-11 PROCEDURE — 92928 PRQ TCAT PLMT NTRAC ST 1 LES: CPT | Performed by: INTERNAL MEDICINE

## 2025-04-11 PROCEDURE — 82948 REAGENT STRIP/BLOOD GLUCOSE: CPT

## 2025-04-11 PROCEDURE — 92978 ENDOLUMINL IVUS OCT C 1ST: CPT | Performed by: INTERNAL MEDICINE

## 2025-04-11 PROCEDURE — 63710000001 BUPROPION XL 300 MG TABLET SUSTAINED-RELEASE 24 HOUR: Performed by: INTERNAL MEDICINE

## 2025-04-11 PROCEDURE — C1725 CATH, TRANSLUMIN NON-LASER: HCPCS | Performed by: INTERNAL MEDICINE

## 2025-04-11 PROCEDURE — C1769 GUIDE WIRE: HCPCS | Performed by: INTERNAL MEDICINE

## 2025-04-11 PROCEDURE — C1894 INTRO/SHEATH, NON-LASER: HCPCS | Performed by: INTERNAL MEDICINE

## 2025-04-11 PROCEDURE — 93454 CORONARY ARTERY ANGIO S&I: CPT | Performed by: INTERNAL MEDICINE

## 2025-04-11 PROCEDURE — 25810000003 SODIUM CHLORIDE 0.9 % SOLUTION: Performed by: INTERNAL MEDICINE

## 2025-04-11 PROCEDURE — 25510000001 IOPAMIDOL PER 1 ML: Performed by: INTERNAL MEDICINE

## 2025-04-11 PROCEDURE — 85347 COAGULATION TIME ACTIVATED: CPT

## 2025-04-11 PROCEDURE — 93010 ELECTROCARDIOGRAM REPORT: CPT | Performed by: INTERNAL MEDICINE

## 2025-04-11 PROCEDURE — G0378 HOSPITAL OBSERVATION PER HR: HCPCS

## 2025-04-11 PROCEDURE — 63710000001 ATORVASTATIN 20 MG TABLET: Performed by: INTERNAL MEDICINE

## 2025-04-11 PROCEDURE — 25010000002 MIDAZOLAM PER 1 MG: Performed by: INTERNAL MEDICINE

## 2025-04-11 PROCEDURE — 25010000002 LIDOCAINE 2% SOLUTION: Performed by: INTERNAL MEDICINE

## 2025-04-11 PROCEDURE — C9600 PERC DRUG-EL COR STENT SING: HCPCS | Performed by: INTERNAL MEDICINE

## 2025-04-11 PROCEDURE — 63710000001 TEMAZEPAM 15 MG CAPSULE: Performed by: INTERNAL MEDICINE

## 2025-04-11 PROCEDURE — 63710000001 HYDROCODONE-ACETAMINOPHEN 5-325 MG TABLET: Performed by: INTERNAL MEDICINE

## 2025-04-11 PROCEDURE — 63710000001 CLOPIDOGREL 75 MG TABLET: Performed by: INTERNAL MEDICINE

## 2025-04-11 PROCEDURE — 25010000002 HEPARIN (PORCINE) PER 1000 UNITS: Performed by: INTERNAL MEDICINE

## 2025-04-11 PROCEDURE — 63710000001 ASPIRIN 81 MG TABLET DELAYED-RELEASE: Performed by: INTERNAL MEDICINE

## 2025-04-11 PROCEDURE — C1753 CATH, INTRAVAS ULTRASOUND: HCPCS | Performed by: INTERNAL MEDICINE

## 2025-04-11 PROCEDURE — 25010000002 FENTANYL CITRATE (PF) 50 MCG/ML SOLUTION: Performed by: INTERNAL MEDICINE

## 2025-04-11 DEVICE — XIENCE SKYPOINT™ EVEROLIMUS ELUTING CORONARY STENT SYSTEM 2.25 MM X 33 MM / RAPID-EXCHANGE
Type: IMPLANTABLE DEVICE | Site: CORONARY | Status: FUNCTIONAL
Brand: XIENCE SKYPOINT™

## 2025-04-11 DEVICE — XIENCE SKYPOINT™ EVEROLIMUS ELUTING CORONARY STENT SYSTEM 3.00 MM X 48 MM / RAPID-EXCHANGE
Type: IMPLANTABLE DEVICE | Site: CORONARY | Status: FUNCTIONAL
Brand: XIENCE SKYPOINT™

## 2025-04-11 DEVICE — XIENCE SKYPOINT™ EVEROLIMUS ELUTING CORONARY STENT SYSTEM 3.00 MM X 12 MM / RAPID-EXCHANGE
Type: IMPLANTABLE DEVICE | Site: CORONARY | Status: FUNCTIONAL
Brand: XIENCE SKYPOINT™

## 2025-04-11 RX ORDER — SODIUM CHLORIDE 0.9 % (FLUSH) 0.9 %
10 SYRINGE (ML) INJECTION EVERY 12 HOURS SCHEDULED
Status: DISCONTINUED | OUTPATIENT
Start: 2025-04-11 | End: 2025-04-12 | Stop reason: HOSPADM

## 2025-04-11 RX ORDER — BUPROPION HYDROCHLORIDE 300 MG/1
300 TABLET ORAL DAILY
Status: DISCONTINUED | OUTPATIENT
Start: 2025-04-11 | End: 2025-04-12 | Stop reason: HOSPADM

## 2025-04-11 RX ORDER — ASPIRIN 81 MG/1
81 TABLET ORAL DAILY
Status: DISCONTINUED | OUTPATIENT
Start: 2025-04-11 | End: 2025-04-11 | Stop reason: SDUPTHER

## 2025-04-11 RX ORDER — ASPIRIN 81 MG/1
81 TABLET ORAL DAILY
Status: DISCONTINUED | OUTPATIENT
Start: 2025-04-11 | End: 2025-04-12 | Stop reason: HOSPADM

## 2025-04-11 RX ORDER — SODIUM CHLORIDE 9 MG/ML
75 INJECTION, SOLUTION INTRAVENOUS CONTINUOUS
Status: ACTIVE | OUTPATIENT
Start: 2025-04-11 | End: 2025-04-11

## 2025-04-11 RX ORDER — NITROGLYCERIN 0.4 MG/1
0.4 TABLET SUBLINGUAL
Status: DISCONTINUED | OUTPATIENT
Start: 2025-04-11 | End: 2025-04-12 | Stop reason: HOSPADM

## 2025-04-11 RX ORDER — IOPAMIDOL 755 MG/ML
INJECTION, SOLUTION INTRAVASCULAR
Status: DISCONTINUED | OUTPATIENT
Start: 2025-04-11 | End: 2025-04-11 | Stop reason: HOSPADM

## 2025-04-11 RX ORDER — MORPHINE SULFATE 2 MG/ML
2 INJECTION, SOLUTION INTRAMUSCULAR; INTRAVENOUS
Status: DISCONTINUED | OUTPATIENT
Start: 2025-04-11 | End: 2025-04-12 | Stop reason: HOSPADM

## 2025-04-11 RX ORDER — LEVOTHYROXINE SODIUM 25 UG/1
25 TABLET ORAL NIGHTLY
COMMUNITY

## 2025-04-11 RX ORDER — HYDROCODONE BITARTRATE AND ACETAMINOPHEN 5; 325 MG/1; MG/1
1 TABLET ORAL EVERY 4 HOURS PRN
Refills: 0 | Status: DISCONTINUED | OUTPATIENT
Start: 2025-04-11 | End: 2025-04-12 | Stop reason: HOSPADM

## 2025-04-11 RX ORDER — CLOPIDOGREL BISULFATE 75 MG/1
75 TABLET ORAL DAILY
Status: DISCONTINUED | OUTPATIENT
Start: 2025-04-12 | End: 2025-04-12 | Stop reason: HOSPADM

## 2025-04-11 RX ORDER — VERAPAMIL HYDROCHLORIDE 2.5 MG/ML
INJECTION, SOLUTION INTRAVENOUS
Status: DISCONTINUED | OUTPATIENT
Start: 2025-04-11 | End: 2025-04-11 | Stop reason: HOSPADM

## 2025-04-11 RX ORDER — ONDANSETRON 4 MG/1
4 TABLET, ORALLY DISINTEGRATING ORAL EVERY 6 HOURS PRN
Status: DISCONTINUED | OUTPATIENT
Start: 2025-04-11 | End: 2025-04-12 | Stop reason: HOSPADM

## 2025-04-11 RX ORDER — TEMAZEPAM 15 MG/1
15 CAPSULE ORAL NIGHTLY PRN
Status: DISCONTINUED | OUTPATIENT
Start: 2025-04-11 | End: 2025-04-12 | Stop reason: HOSPADM

## 2025-04-11 RX ORDER — SODIUM CHLORIDE 9 MG/ML
50 INJECTION, SOLUTION INTRAVENOUS CONTINUOUS
Status: ACTIVE | OUTPATIENT
Start: 2025-04-11 | End: 2025-04-11

## 2025-04-11 RX ORDER — ACETAMINOPHEN 325 MG/1
650 TABLET ORAL EVERY 4 HOURS PRN
Status: DISCONTINUED | OUTPATIENT
Start: 2025-04-11 | End: 2025-04-12 | Stop reason: HOSPADM

## 2025-04-11 RX ORDER — LIDOCAINE HYDROCHLORIDE 20 MG/ML
INJECTION, SOLUTION INFILTRATION; PERINEURAL
Status: DISCONTINUED | OUTPATIENT
Start: 2025-04-11 | End: 2025-04-11 | Stop reason: HOSPADM

## 2025-04-11 RX ORDER — ONDANSETRON 2 MG/ML
4 INJECTION INTRAMUSCULAR; INTRAVENOUS EVERY 6 HOURS PRN
Status: DISCONTINUED | OUTPATIENT
Start: 2025-04-11 | End: 2025-04-12 | Stop reason: HOSPADM

## 2025-04-11 RX ORDER — NALOXONE HCL 0.4 MG/ML
0.4 VIAL (ML) INJECTION
Status: DISCONTINUED | OUTPATIENT
Start: 2025-04-11 | End: 2025-04-12 | Stop reason: HOSPADM

## 2025-04-11 RX ORDER — MIDAZOLAM HYDROCHLORIDE 1 MG/ML
INJECTION, SOLUTION INTRAMUSCULAR; INTRAVENOUS
Status: DISCONTINUED | OUTPATIENT
Start: 2025-04-11 | End: 2025-04-11 | Stop reason: HOSPADM

## 2025-04-11 RX ORDER — HEPARIN SODIUM 1000 [USP'U]/ML
INJECTION, SOLUTION INTRAVENOUS; SUBCUTANEOUS
Status: DISCONTINUED | OUTPATIENT
Start: 2025-04-11 | End: 2025-04-11 | Stop reason: HOSPADM

## 2025-04-11 RX ORDER — ATORVASTATIN CALCIUM 20 MG/1
40 TABLET, FILM COATED ORAL NIGHTLY
Status: DISCONTINUED | OUTPATIENT
Start: 2025-04-11 | End: 2025-04-12 | Stop reason: HOSPADM

## 2025-04-11 RX ORDER — SODIUM CHLORIDE 0.9 % (FLUSH) 0.9 %
10 SYRINGE (ML) INJECTION AS NEEDED
Status: DISCONTINUED | OUTPATIENT
Start: 2025-04-11 | End: 2025-04-12 | Stop reason: HOSPADM

## 2025-04-11 RX ORDER — FENTANYL CITRATE 50 UG/ML
INJECTION, SOLUTION INTRAMUSCULAR; INTRAVENOUS
Status: DISCONTINUED | OUTPATIENT
Start: 2025-04-11 | End: 2025-04-11 | Stop reason: HOSPADM

## 2025-04-11 RX ORDER — CLOPIDOGREL BISULFATE 75 MG/1
TABLET ORAL
Status: DISCONTINUED | OUTPATIENT
Start: 2025-04-11 | End: 2025-04-11 | Stop reason: HOSPADM

## 2025-04-11 RX ORDER — ATENOLOL 25 MG/1
25 TABLET ORAL DAILY
Status: DISCONTINUED | OUTPATIENT
Start: 2025-04-11 | End: 2025-04-12 | Stop reason: HOSPADM

## 2025-04-11 RX ADMIN — ASPIRIN 81 MG: 81 TABLET, COATED ORAL at 20:41

## 2025-04-11 RX ADMIN — HYDROCODONE BITARTRATE AND ACETAMINOPHEN 1 TABLET: 5; 325 TABLET ORAL at 15:47

## 2025-04-11 RX ADMIN — Medication 10 ML: at 20:54

## 2025-04-11 RX ADMIN — TEMAZEPAM 15 MG: 15 CAPSULE ORAL at 22:50

## 2025-04-11 RX ADMIN — SODIUM CHLORIDE 75 ML/HR: 9 INJECTION, SOLUTION INTRAVENOUS at 11:12

## 2025-04-11 RX ADMIN — ATENOLOL 25 MG: 25 TABLET ORAL at 15:47

## 2025-04-11 RX ADMIN — BUPROPION HYDROCHLORIDE 300 MG: 300 TABLET, EXTENDED RELEASE ORAL at 15:47

## 2025-04-11 RX ADMIN — HYDROCODONE BITARTRATE AND ACETAMINOPHEN 1 TABLET: 5; 325 TABLET ORAL at 20:40

## 2025-04-11 RX ADMIN — ATORVASTATIN CALCIUM 40 MG: 20 TABLET, FILM COATED ORAL at 20:40

## 2025-04-11 NOTE — Clinical Note
Hemostasis started on the right radial artery. R-Band was used in achieving hemostasis. Radial compression device applied to vessel. Hemostasis achieved successfully. Closure device additional comment: TR band 14cc air

## 2025-04-11 NOTE — Clinical Note
First balloon inflation max pressure = 18 jeanie. First balloon inflation duration = 12 seconds. Second inflation of balloon - Max pressure = 18 jeanie. 2nd Inflation of balloon - Duration = 12 seconds. 2nd inflation was done at 13:08 EDT. Third inflation of balloon - Max pressure = 20 jeanie. 3rd Inflation of balloon - Duration = 10 seconds. 3rd inflation was done at 13:08 EDT. Fourth inflation of balloon - Max pressure = 20 jeanie. 4th Inflation  of balloon - Duration = 12 seconds. 4th inflation was done at 13:09 EDT.

## 2025-04-11 NOTE — CONSULTS
Met with patient to discuss the benefits of cardiac rehab. Provided phase II information along with the contact information for cardiac rehab here at [facility]. Pt unsure if he will attend here or [facility]. Will call after discharge.

## 2025-04-11 NOTE — Clinical Note
First balloon inflation max pressure = 20 jeanie. First balloon inflation duration = 10 seconds. Second inflation of balloon - Max pressure = 20 jeanie. 2nd Inflation of balloon - Duration = 10 seconds. 2nd inflation was done at 12:56 EDT.

## 2025-04-11 NOTE — Clinical Note
First balloon inflation max pressure = 16 jeanie. First balloon inflation duration = 10 seconds. Second inflation of balloon - Max pressure = 20 jeanie. 2nd Inflation of balloon - Duration = 8 seconds. 2nd inflation was done at 13:07 EDT.

## 2025-04-11 NOTE — PLAN OF CARE
Goal Outcome Evaluation:              Outcome Evaluation: S/p heart cath PCIx3 LAD. Pt A&Ox4, vss, complaints of chest pain per MAR. rt radial TR band 3cc of air clean, dry, intact, soft.

## 2025-04-11 NOTE — Clinical Note
ACT = 325 (sec). ACT was drawn at 13:25 EDT. ACT result was completed at 13:32 EDT. · Tested positive on 8/7/23  · Completed a medrol dose pack  · Cough has resolved

## 2025-04-11 NOTE — DISCHARGE INSTRUCTIONS
[facility]  [address] [place] [address]    Coronary Angioplasty with or without  Stent (Radial Approach) After Care    Refer to this sheet in the next few weeks. These instructions provide you with information on caring for yourself after your procedure. Your health care provider may also give you more specific instructions. Your treatment has been planned according to current medical practices, but problems sometimes occur. Call your health care provider if you have any problems or questions after your procedure.       Home Care Instructions:  You may shower the day after the procedure. Remove the bandage (dressing) and gently wash the site with plain soap and water. Gently pat the site dry. You may apply a band aid daily for 2 days if desired.    Do not apply powder or lotion to the site.  Do not submerge the affected site in water for 3 to 5 days or until the site is completely healed.   Do not flex or bend at the wrist with affected arm for 24 hours.  Do not lift, push or pull anything over 5 pounds for 5 days after your procedure or as directed by your physician.  For a reference, a gallon of milk weighs 8 pounds.    Do not operate machinery or power tools for 24 hours.  Inspect the site at least twice daily. You may notice some bruising at the site and it may be tender for 1 to 2 weeks.     Increase your fluid intake for the next 2 days.    Keep arm elevated for 24 hours.  For the remainder of the day, keep your arm in the “Pledge of Allegiance” position when up and about.    Limit your activity for the next 48 hours and avoid strenuous activity as directed by your physician.   Cardiac Rehab may or may not be ordered.  Please consult with your physician  You may drive 24 hours after the procedure unless otherwise instructed by your caregiver.  A responsible adult should be with you for the first 24 hours after you arrive home.   Do not make any important legal decisions or sign legal  papers for 24 hours. Do not drink alcohol for 24 hours.    Take medicines only as directed by your health care provider.  Blood thinners may be prescribed after your procedure to improve blood flow through the stent.    Metformin or any medications containing Metformin should not be taken for 48 hours after your procedure.    Eat a heart-healthy diet. This should include plenty of fresh fruits and vegetables. Meat should be lean cuts. Avoid the following types of food:    Food that is high in salt.    Canned or highly processed food.    Food that is high in saturated fat or sugar.    Fried food.    Make any other lifestyle changes recommended by your health care provider. This may include:    Not using any tobacco products including cigarettes, chewing tobacco, or electronic cigarettes.   Managing your weight.    Getting regular exercise.    Managing your blood pressure.    Limiting your alcohol intake.    Managing other health problems, such as diabetes.    If you need an MRI after your heart stent was placed, be sure to tell the health care provider who orders the MRI that you have a heart stent.    Keep all follow-up visits as directed by your health care provider.      Call Your Doctor If:    You have unusual pain at the radial/ulnar (wrist) site.  You have redness, warmth, swelling, or pain at the radial/ulnar (wrist) site.  You have drainage (other than a small amount of blood on the dressing).  `You have chills or a fever > 101.  Your arm becomes pale or dark, cool, tingly, or numb.  You develop chest pain, shortness of breath, feel faint or pass out.    You have heavy bleeding from the site.  If you do, hold pressure on the site for 20 minutes.  If the bleeding stops, apply a fresh bandage and call your cardiologist.  However, if you continue to have bleeding, maintain pressure and call 911.    You have any symptoms of a stroke.  Remember BE FAST  B-balance. Sudden trouble walking or loss of  balance.  E-eyes.  Sudden changes in how you see or a sudden onset of a very bad headache.   F-face. Sudden weakness or loss of feeling of the face or facial droop on one side.   A-arms Sudden weakness or numbness in one arm. One arm drifts down if they are both held out in front of you. This happens suddenly and usually on one side of the body.   S-speech.  Sudden trouble speaking, slurred speech or trouble understanding what people are saying.   T-time  Time to call emergency services.  Write down the symptoms and the time they started.

## 2025-04-12 VITALS
WEIGHT: 238 LBS | HEART RATE: 65 BPM | DIASTOLIC BLOOD PRESSURE: 61 MMHG | HEIGHT: 70 IN | TEMPERATURE: 97.5 F | OXYGEN SATURATION: 98 % | SYSTOLIC BLOOD PRESSURE: 121 MMHG | BODY MASS INDEX: 34.07 KG/M2 | RESPIRATION RATE: 16 BRPM

## 2025-04-12 LAB
ANION GAP SERPL CALCULATED.3IONS-SCNC: 11 MMOL/L (ref 5–15)
BUN SERPL-MCNC: 24 MG/DL (ref 8–23)
BUN/CREAT SERPL: 19.4 (ref 7–25)
CALCIUM SPEC-SCNC: 8.4 MG/DL (ref 8.6–10.5)
CHLORIDE SERPL-SCNC: 107 MMOL/L (ref 98–107)
CHOLEST SERPL-MCNC: 176 MG/DL (ref 0–200)
CO2 SERPL-SCNC: 18 MMOL/L (ref 22–29)
CREAT SERPL-MCNC: 1.24 MG/DL (ref 0.76–1.27)
DEPRECATED RDW RBC AUTO: 47.7 FL (ref 37–54)
EGFRCR SERPLBLD CKD-EPI 2021: 62.5 ML/MIN/1.73
ERYTHROCYTE [DISTWIDTH] IN BLOOD BY AUTOMATED COUNT: 12.6 % (ref 12.3–15.4)
GLUCOSE BLDC GLUCOMTR-MCNC: 136 MG/DL (ref 70–130)
GLUCOSE BLDC GLUCOMTR-MCNC: 149 MG/DL (ref 70–130)
GLUCOSE SERPL-MCNC: 120 MG/DL (ref 65–99)
HBA1C MFR BLD: 6.6 % (ref 4.8–5.6)
HCT VFR BLD AUTO: 42.9 % (ref 37.5–51)
HDLC SERPL-MCNC: 41 MG/DL (ref 40–60)
HGB BLD-MCNC: 14.5 G/DL (ref 13–17.7)
LDLC SERPL CALC-MCNC: 93 MG/DL (ref 0–100)
LDLC/HDLC SERPL: 2.08 {RATIO}
MCH RBC QN AUTO: 34.7 PG (ref 26.6–33)
MCHC RBC AUTO-ENTMCNC: 33.8 G/DL (ref 31.5–35.7)
MCV RBC AUTO: 102.6 FL (ref 79–97)
PLATELET # BLD AUTO: 119 10*3/MM3 (ref 140–450)
PMV BLD AUTO: 11 FL (ref 6–12)
POTASSIUM SERPL-SCNC: 3.9 MMOL/L (ref 3.5–5.2)
RBC # BLD AUTO: 4.18 10*6/MM3 (ref 4.14–5.8)
SODIUM SERPL-SCNC: 136 MMOL/L (ref 136–145)
TRIGL SERPL-MCNC: 249 MG/DL (ref 0–150)
VLDLC SERPL-MCNC: 42 MG/DL (ref 5–40)
WBC NRBC COR # BLD AUTO: 5.87 10*3/MM3 (ref 3.4–10.8)

## 2025-04-12 PROCEDURE — 80048 BASIC METABOLIC PNL TOTAL CA: CPT | Performed by: INTERNAL MEDICINE

## 2025-04-12 PROCEDURE — 85027 COMPLETE CBC AUTOMATED: CPT | Performed by: INTERNAL MEDICINE

## 2025-04-12 PROCEDURE — A9270 NON-COVERED ITEM OR SERVICE: HCPCS | Performed by: INTERNAL MEDICINE

## 2025-04-12 PROCEDURE — 93005 ELECTROCARDIOGRAM TRACING: CPT | Performed by: INTERNAL MEDICINE

## 2025-04-12 PROCEDURE — 82948 REAGENT STRIP/BLOOD GLUCOSE: CPT

## 2025-04-12 PROCEDURE — 63710000001 ASPIRIN 81 MG TABLET DELAYED-RELEASE: Performed by: INTERNAL MEDICINE

## 2025-04-12 PROCEDURE — 63710000001 BUPROPION XL 300 MG TABLET SUSTAINED-RELEASE 24 HOUR: Performed by: INTERNAL MEDICINE

## 2025-04-12 PROCEDURE — 80061 LIPID PANEL: CPT | Performed by: INTERNAL MEDICINE

## 2025-04-12 PROCEDURE — 63710000001 CLOPIDOGREL 75 MG TABLET: Performed by: INTERNAL MEDICINE

## 2025-04-12 PROCEDURE — 93010 ELECTROCARDIOGRAM REPORT: CPT | Performed by: INTERNAL MEDICINE

## 2025-04-12 PROCEDURE — 83036 HEMOGLOBIN GLYCOSYLATED A1C: CPT | Performed by: INTERNAL MEDICINE

## 2025-04-12 PROCEDURE — 63710000001 ATENOLOL 25 MG TABLET: Performed by: INTERNAL MEDICINE

## 2025-04-12 PROCEDURE — 99239 HOSP IP/OBS DSCHRG MGMT >30: CPT

## 2025-04-12 PROCEDURE — G0378 HOSPITAL OBSERVATION PER HR: HCPCS

## 2025-04-12 PROCEDURE — 63710000001 HYDROCODONE-ACETAMINOPHEN 5-325 MG TABLET: Performed by: INTERNAL MEDICINE

## 2025-04-12 RX ORDER — BUPROPION HYDROCHLORIDE 150 MG/1
300 TABLET ORAL DAILY
Qty: 30 TABLET | Refills: 1 | Status: SHIPPED | OUTPATIENT
Start: 2025-04-12

## 2025-04-12 RX ORDER — NITROGLYCERIN 0.4 MG/1
0.4 TABLET SUBLINGUAL
Qty: 25 TABLET | Refills: 12 | Status: SHIPPED | OUTPATIENT
Start: 2025-04-12

## 2025-04-12 RX ORDER — ATORVASTATIN CALCIUM 40 MG/1
40 TABLET, FILM COATED ORAL NIGHTLY
Qty: 90 TABLET | Refills: 3 | Status: SHIPPED | OUTPATIENT
Start: 2025-04-12 | End: 2025-04-18 | Stop reason: SDUPTHER

## 2025-04-12 RX ORDER — CLOPIDOGREL BISULFATE 75 MG/1
75 TABLET ORAL DAILY
Qty: 30 TABLET | Refills: 11 | Status: SHIPPED | OUTPATIENT
Start: 2025-04-13 | End: 2025-04-18 | Stop reason: SDUPTHER

## 2025-04-12 RX ADMIN — ATENOLOL 25 MG: 25 TABLET ORAL at 08:05

## 2025-04-12 RX ADMIN — ASPIRIN 81 MG: 81 TABLET, COATED ORAL at 08:05

## 2025-04-12 RX ADMIN — Medication 10 ML: at 08:12

## 2025-04-12 RX ADMIN — HYDROCODONE BITARTRATE AND ACETAMINOPHEN 1 TABLET: 5; 325 TABLET ORAL at 08:05

## 2025-04-12 RX ADMIN — CLOPIDOGREL BISULFATE 75 MG: 75 TABLET, FILM COATED ORAL at 08:05

## 2025-04-12 RX ADMIN — BUPROPION HYDROCHLORIDE 300 MG: 300 TABLET, EXTENDED RELEASE ORAL at 08:05

## 2025-04-12 NOTE — PLAN OF CARE
Goal Outcome Evaluation:  Plan of Care Reviewed With: patient        Progress: improving  Outcome Evaluation: VSS; A&Ox4. Pt s/p heart cath with 3 stents to LAD. Pt remains room air; sat 92-97; -125. Pt with right radial site remains c/d/s. Pt with c/o pain controlled by prn pain med. Plan of care is ongoing.

## 2025-04-12 NOTE — DISCHARGE SUMMARY
Hospital Discharge    Patient Name: Kashmir Moura  Age/Sex: 69 y.o. male  : 1955  MRN: [id number]    Encounter Provider: TAMAR Grant  Referring Provider: Harshad Shipman MD  Place of Service: [facility] CARDIOLOGY  Patient Care Team:  Elder Almeida MD as PCP - General (Family Medicine)         Date of Discharge:  2025   Date of Admit: 2025    Discharge Condition: Good  Discharge Diagnosis:    Chest pain    Precordial pain    Dyspnea on exertion    Abnormal nuclear stress test      Hospital Course:   Kashmir Moura is a 69 y.o. male who is followed by Dr. Guevara and has a history of bladder cancer s/p resection and [place] pouch.  He is a former cigarette smoker.  He has hypertriglyceridemia. His last A1c was 6.5% and he was started on metformin. He had a CT in 2014 that reported coronary artery calcification but did not quantify this.     Six weeks ago he developed intense substernal chest pressure. It did not radiate. He was very short of breath and had nausea and vomiting.  He reports the pain lasted around 1 hour.  He was evaluated by EMS who performed an EKG and did not administer any medications.  He was not brought to the hospital at that time.  2 weeks later he had a similar episode episode of pain which was shorter and not nearly as severe.  He was evaluated by Dr. Guevara on 3/28/2025.  He underwent echocardiogram which was normal but his stress test was abnormal.  Given his risk factors he was scheduled for a cardiac cath.    This was performed on 2025 by Dr. Shipman.  He underwent successful PCI of the proximal to mid to distal LAD with overlapping 3.0 x 12 mm, 3.0 x 48 mm, and 2.25 x 33 mm Xience srinath point drug-eluting stents.  He was loaded with Plavix and will be continued on this and baby aspirin at discharge.  He will also be initiated on 40 mg of atorvastatin at discharge.  Continue atenolol 25 mg daily.  He will attend  cardiac rehab at discharge.    On exam today he is resting in bed without complaint.  He is ambulated in the room without issue. His right radial site is C/D/I.  I will arrange a 1 week follow-up in our office.    Objective:  Temp:  [97.2 °F (36.2 °C)-97.5 °F (36.4 °C)] 97.5 °F (36.4 °C)  Heart Rate:  [57-65] 65  Resp:  [12-18] 16  BP: (111-185)/() 121/61    Intake/Output Summary (Last 24 hours) at 4/12/2025 1110  Last data filed at 4/12/2025 0804  Gross per 24 hour   Intake 960 ml   Output 900 ml   Net 60 ml     Body mass index is 34.15 kg/m².      04/11/25  1057   Weight: 108 kg (238 lb)     Weight change:     Physical Exam:  Vitals reviewed.   Constitutional:       Appearance: Not in distress.      Comments: Obese   Eyes:      Conjunctiva/sclera: Conjunctivae normal.   HENT:      Head: Normocephalic.      Nose: Nose normal.   Neck:      Vascular: JVD normal.   Pulmonary:      Effort: Pulmonary effort is normal.      Breath sounds: Normal breath sounds.   Cardiovascular:      Normal rate. Regular rhythm.      Murmurs: There is no murmur.   Pulses:     Intact distal pulses.   Right radial cath site well healed without erythema or ecchymosis, palpable proximal and distal pulses, good capillary refill, good motor function of hand, no thrill or bruit detected, site is soft and non-tender with no hematoma, no sensory deficits noted.  Edema:     Peripheral edema absent.   Abdominal:      Palpations: Abdomen is soft.      Tenderness: There is no abdominal tenderness.      Comments: Obesity limits abdominal exam   Musculoskeletal: Normal range of motion.      Cervical back: Normal range of motion. Skin:     General: Skin is warm and dry.   Neurological:      Mental Status: Alert and oriented to person, place, and time.      Cranial Nerves: No cranial nerve deficit.   Psychiatric:         Behavior: Behavior normal.         Thought Content: Thought content normal.         Judgment: Judgment normal.     Procedures  Performed  Procedure(s):  Coronary angiography  Left heart cath  Percutaneous Coronary Intervention  Stent CRIS coronary  Optical Coherence Tomography       Consults:  Consults       No orders found for last 30 day(s).            Pertinent Test Results:  Results from last 7 days   Lab Units 04/12/25  0314 04/09/25  1120   SODIUM mmol/L 136 136   POTASSIUM mmol/L 3.9 4.5   CHLORIDE mmol/L 107 108*   CO2 mmol/L 18.0* 18.8*   BUN mg/dL 24* 29*   CREATININE mg/dL 1.24 1.25   GLUCOSE mg/dL 120* 159*   CALCIUM mg/dL 8.4* 9.0         Results from last 7 days   Lab Units 04/12/25  0314 04/09/25  1120   WBC 10*3/mm3 5.87 8.21   HEMOGLOBIN g/dL 14.5 15.5   HEMATOCRIT % 42.9 44.8   PLATELETS 10*3/mm3 119* 170             Results from last 7 days   Lab Units 04/12/25  0314   CHOLESTEROL mg/dL 176   TRIGLYCERIDES mg/dL 249*   HDL CHOL mg/dL 41               Discharge Medications     Discharge Medications        PAUSE taking these medications        Instructions Start Date   metFORMIN 1000 MG tablet  Wait to take this until: April 14, 2025 Evening  Commonly known as: GLUCOPHAGE   1,000 mg, Daily             New Medications        Instructions Start Date   atorvastatin 40 MG tablet  Commonly known as: LIPITOR   40 mg, Oral, Nightly      clopidogrel 75 MG tablet  Commonly known as: PLAVIX   75 mg, Oral, Daily   Start Date: April 13, 2025     nitroglycerin 0.4 MG SL tablet  Commonly known as: NITROSTAT   0.4 mg, Sublingual, Every 5 Minutes PRN, Take no more than 3 doses in 15 minutes.             Continue These Medications        Instructions Start Date   aspirin 81 MG EC tablet   81 mg, Oral, Daily      atenolol 25 MG tablet  Commonly known as: TENORMIN   25 mg, Oral, Daily      buPROPion  MG 24 hr tablet  Commonly known as: WELLBUTRIN XL   300 mg, Oral, Daily      diazePAM 10 MG tablet  Commonly known as: VALIUM   TAKE 1 TABLET BY MOUTH EVERY DAY AS NEEDED FOR MUSCLE SPASM      levothyroxine 25 MCG tablet  Commonly known as:  SYNTHROID, LEVOTHROID   25 mcg, Nightly               Discharge Diet:    Dietary Orders (From admission, onward)       Start     Ordered    04/12/25 0001  NPO Diet NPO Type: Strict NPO  Diet Effective Midnight        Question:  NPO Type  Answer:  Strict NPO    04/11/25 1034                    Activity at Discharge:  As tolerated.     Discharge disposition: home     Discharge Instructions and Follow ups:  No future appointments.  Additional Instructions for the Follow-ups that You Need to Schedule       Ambulatory Referral to Cardiac Rehab   As directed             Follow-up Information       [facility] .    Specialty: Cardiac Rehabilitation  Contact information:  [address] [facility] [address] [phone]             Elder Almeida MD .    Specialty: Family Medicine  Contact information:  [address] [id number] [phone]                             Test Results Pending at Discharge: None      TAMAR Grant  04/12/25  11:10 EDT    Time: Discharge 35 min    EMR Dragon/Transcription disclaimer:   Part of this note may be an electronic transcription/translation of spoken language to printed text using the Dragon dictation system.

## 2025-04-13 LAB
QT INTERVAL: 443 MS
QT INTERVAL: 484 MS
QTC INTERVAL: 443 MS
QTC INTERVAL: 469 MS

## 2025-04-14 ENCOUNTER — TELEPHONE (OUTPATIENT)
Dept: CARDIOLOGY | Age: 70
End: 2025-04-14
Payer: MEDICARE

## 2025-04-14 ENCOUNTER — RESULTS FOLLOW-UP (OUTPATIENT)
Dept: CARDIOLOGY | Facility: HOSPITAL | Age: 70
End: 2025-04-14
Payer: MEDICARE

## 2025-04-14 LAB
ACT BLD: 325 SECONDS (ref 82–152)
ACT BLD: 383 SECONDS (ref 82–152)

## 2025-04-14 NOTE — TELEPHONE ENCOUNTER
Caller: Kashmir Moura    Relationship: Self    Best call back number: [phone]      PATIENT HAD HEART CATH 4.11.25 AND IS NEEDING A HOSPITAL FOLLOW.  NO SCHEDULING DIRECTIONS IN DISCHARGE PAPERS.

## 2025-04-14 NOTE — TELEPHONE ENCOUNTER
I spoke with patient about recent referral to cardiac rehab.  He is interested in the program but prefers to attend program at [facility] which is much closer to his home.  I faxed his referral to that location at his request and also gave him their contact information.    Thank you for allowing us to help care for your patients.

## 2025-04-15 NOTE — PROGRESS NOTES
RM:________     PCP: Elder Almeida MD                                     Last EKG :  2025    : 1955  AGE: 69 y.o.    REASON FOR VISIT: __________________________________________    ____________________________________________________________                                                       Wt Readings from Last 3 Encounters:   25 108 kg (238 lb)   25 108 kg (238 lb)   25 108 kg (238 lb 1.6 oz)      BP Readings from Last 3 Encounters:   25 121/61   25 143/79   25 138/88          WT: ____________ HT: ______ BP: __________ HR ______   02% _______                 Lipid Panel          2025    03:14   Lipid Panel   Total Cholesterol 176    Triglycerides 249    HDL Cholesterol 41    VLDL Cholesterol 42    LDL Cholesterol  93    LDL/HDL Ratio 2.08

## 2025-04-18 ENCOUNTER — OFFICE VISIT (OUTPATIENT)
Dept: CARDIOLOGY | Age: 70
End: 2025-04-18
Payer: MEDICARE

## 2025-04-18 ENCOUNTER — TELEPHONE (OUTPATIENT)
Dept: CARDIOLOGY | Age: 70
End: 2025-04-18

## 2025-04-18 VITALS
BODY MASS INDEX: 33.9 KG/M2 | HEART RATE: 72 BPM | OXYGEN SATURATION: 96 % | WEIGHT: 236.8 LBS | DIASTOLIC BLOOD PRESSURE: 72 MMHG | HEIGHT: 70 IN | SYSTOLIC BLOOD PRESSURE: 114 MMHG

## 2025-04-18 DIAGNOSIS — E11.9 TYPE 2 DIABETES MELLITUS WITHOUT COMPLICATION, WITHOUT LONG-TERM CURRENT USE OF INSULIN: ICD-10-CM

## 2025-04-18 DIAGNOSIS — I25.10 CORONARY ARTERY DISEASE INVOLVING NATIVE CORONARY ARTERY OF NATIVE HEART WITHOUT ANGINA PECTORIS: Primary | ICD-10-CM

## 2025-04-18 RX ORDER — CLOPIDOGREL BISULFATE 75 MG/1
75 TABLET ORAL DAILY
Qty: 90 TABLET | Refills: 11 | Status: SHIPPED | OUTPATIENT
Start: 2025-04-18

## 2025-04-18 RX ORDER — ATORVASTATIN CALCIUM 40 MG/1
40 TABLET, FILM COATED ORAL NIGHTLY
Qty: 90 TABLET | Refills: 3 | Status: SHIPPED | OUTPATIENT
Start: 2025-04-18

## 2025-04-18 NOTE — PROGRESS NOTES
CARDIOLOGY        Patient Name: Kashmir Moura  :1955  Age: 69 y.o.  Primary Cardiologist: Kemal Guevara MD  Encounter Provider:  TAMAR Nguyen    Date of Service: 2025      CHIEF COMPLAINT / REASON FOR OFFICE VISIT     Follow-up (1 week post heart cath) and Coronary Artery Disease      HPI  Kashmir Moura is a 69 y.o. male who presents today for 1 week follow-up after cardiac catheterization.       HISTORY OF PRESENT ILLNESS       Pt has a  history significant for bladder cancer status post resection and [place] pouch, HTN, HLD, type 2 diabetes, history of premature CAD.    Patient reports that he has done well since the cardiac cath.  He reports resolution of the chest pain.  He admits that he has been sedentary since procedure, he had some lightheadedness and ZABALA with exertion as a result of deconditioning.  Denies problems with radial insertion site.  He is compliant with all medications.        INTERVAL HISTORY       Patient presented to clinic for evaluation of chest pain in 2025.  He had had a CT in 2024 that revealed coronary artery calcification but images were unable to be reviewed.  Patient admitted that he was fairly sedentary.  Chest pain was concerning for possible ischemia.  ECG revealed bifascicular block with new RBBB compared to .  Patient was referred for echocardiogram and myocardial perfusion stress test.    Echocardiogram 2025 revealed LVEF 63% with grade 1 diastolic function.    Myocardial perfusion stress test 2025 reveals small sized, mildly severe area of ischemia to the distal inferior wall and apex.    Cardiac catheterization 2025 reveals normal left main.  LAD with diffuse 60-70% mid vessel stenosis, discrete 95% distal stenosis.  Diagonal branch with discrete 60% proximal stenosis.  Circumflex with discrete 70-80% mid vessel stenosis.  Circumflex is small caliber size with a discrete 60% stenosis.  RCA is dominant with 40-50%  "stenosis.  Successful PCI of the proximal to mid distal LAD with overlapping CRIS.    The following portions of the patient's history were reviewed and updated as appropriate: allergies, current medications, past family history, past medical history, past social history, past surgical history and problem list.      VITAL SIGNS     Visit Vitals  /72 (BP Location: Right arm, Patient Position: Sitting, Cuff Size: Adult)   Pulse 72   Ht 177.8 cm (70\")   Wt 107 kg (236 lb 12.8 oz)   SpO2 96%   BMI 33.98 kg/m²         Wt Readings from Last 3 Encounters:   04/18/25 107 kg (236 lb 12.8 oz)   04/11/25 108 kg (238 lb)   04/04/25 108 kg (238 lb)     Body mass index is 33.98 kg/m².      REVIEW OF SYSTEMS     Review of Systems   Constitutional: Negative for chills, fever, weight gain and weight loss.   Cardiovascular:  Negative for leg swelling.   Respiratory:  Negative for cough, snoring and wheezing.    Hematologic/Lymphatic: Negative for bleeding problem. Does not bruise/bleed easily.   Skin:  Negative for color change.   Musculoskeletal:  Negative for falls, joint pain and myalgias.   Gastrointestinal:  Negative for melena.   Genitourinary:  Negative for hematuria.   Neurological:  Negative for excessive daytime sleepiness.   Psychiatric/Behavioral:  Negative for depression. The patient is not nervous/anxious.            PHYSICAL EXAMINATION     Constitutional:       Appearance: Normal appearance. Well-developed.   Eyes:      Conjunctiva/sclera: Conjunctivae normal.   Neck:      Vascular: No carotid bruit.   Pulmonary:      Effort: Pulmonary effort is normal.      Breath sounds: Normal breath sounds.   Cardiovascular:      Normal rate. Regular rhythm. Normal S1. Normal S2.       Murmurs: There is no murmur.      No gallop.  No click. No rub.   Edema:     Peripheral edema absent.   Musculoskeletal: Normal range of motion. Skin:     General: Skin is warm and dry.   Neurological:      Mental Status: Alert and oriented to " "person, place, and time.      GCS: GCS eye subscore is 4. GCS verbal subscore is 5. GCS motor subscore is 6.   Psychiatric:         Speech: Speech normal.         Behavior: Behavior normal.         Thought Content: Thought content normal.         Judgment: Judgment normal.           REVIEWED DATA     Procedures        Lipid Panel          4/12/2025    03:14   Lipid Panel   Total Cholesterol 176    Triglycerides 249    HDL Cholesterol 41    VLDL Cholesterol 42    LDL Cholesterol  93    LDL/HDL Ratio 2.08        Lab Results   Component Value Date     04/12/2025     04/09/2025    K 3.9 04/12/2025    K 4.5 04/09/2025     04/12/2025     (H) 04/09/2025    CO2 18.0 (L) 04/12/2025    CO2 18.8 (L) 04/09/2025    BUN 24 (H) 04/12/2025    BUN 29 (H) 04/09/2025    CREATININE 1.24 04/12/2025    CREATININE 1.25 04/09/2025    GLUCOSE 120 (H) 04/12/2025    GLUCOSE 159 (H) 04/09/2025    CALCIUM 8.4 (L) 04/12/2025    CALCIUM 9.0 04/09/2025    ALBUMIN 4.20 10/19/2022    ALBUMIN 3.5 12/04/2019    BILITOT 0.5 10/19/2022    BILITOT 0.3 12/04/2019    AST 18 10/19/2022    AST 19 12/04/2019    ALT 16 10/19/2022    ALT 15 12/04/2019     Lab Results   Component Value Date    WBC 5.87 04/12/2025    WBC 8.21 04/09/2025    HGB 14.5 04/12/2025    HGB 15.5 04/09/2025    HCT 42.9 04/12/2025    HCT 44.8 04/09/2025    .6 (H) 04/12/2025    MCV 99.3 (H) 04/09/2025     (L) 04/12/2025     04/09/2025     No results found for: \"PROBNP\", \"BNP\"  No results found for: \"CKTOTAL\", \"CKMB\", \"CKMBINDEX\", \"TROPONINI\", \"TROPONINT\"  Lab Results   Component Value Date    TSH 3.090 10/19/2022         ASSESSMENT & PLAN     Diagnoses and all orders for this visit:    1. Coronary artery disease involving native coronary artery of native heart without angina pectoris (Primary)  Overview:  Cardiac catheterization 4/11/2025 reveals normal left main.  LAD with diffuse 60-70% mid vessel stenosis, discrete 95% distal stenosis.  " Diagonal branch with discrete 60% proximal stenosis.  Circumflex with discrete 70-80% mid vessel stenosis.  Circumflex is small caliber size with a discrete 60% stenosis.  RCA is dominant with 40-50% stenosis.  Successful PCI of the proximal to mid distal LAD with overlapping CRIS.    Assessment & Plan:  Denies angina or dyspnea  Patient made aware of need for uninterrupted DAPT for 1 year  Encouraged to participate in cardiac rehab.    The patient presents today for a 1 week post cardiac catheterization visit.  The following items were reviewed/discussed in clinic:    [x] Check Cath Site  [x] Medication Education for GDMT   [] N/A  [] Labs for new medications   [x] N/A  [x] Nutrition   [] N/A   [x] Exercise   [] N/A  [x] Cardiac Rehab   [] N/A  [] Return to Work   [] N/A      Orders:  -     atorvastatin (LIPITOR) 40 MG tablet; Take 1 tablet by mouth Every Night.  Dispense: 90 tablet; Refill: 3  -     clopidogrel (PLAVIX) 75 MG tablet; Take 1 tablet by mouth Daily.  Dispense: 90 tablet; Refill: 11    2. Type 2 diabetes mellitus without complication, without long-term current use of insulin        Return in about 3 months (around 7/18/2025) for Dr. Guevara, [facility] follow up.    Future Appointments         Provider Department Center    7/16/2025 11:45 AM Kemal Guevara MD [facility] CARDIOLOGY [place]              MEDICATIONS         Discharge Medications            Accurate as of April 18, 2025  3:25 PM. If you have any questions, ask your nurse or doctor.                Continue These Medications        Instructions Start Date   aspirin 81 MG EC tablet   81 mg, Oral, Daily      atenolol 25 MG tablet  Commonly known as: TENORMIN   25 mg, Oral, Daily      atorvastatin 40 MG tablet  Commonly known as: LIPITOR   40 mg, Oral, Nightly      buPROPion  MG 24 hr tablet  Commonly known as: WELLBUTRIN XL   300 mg, Oral, Daily      clopidogrel 75 MG tablet  Commonly known as: PLAVIX   75 mg, Oral, Daily       diazePAM 10 MG tablet  Commonly known as: VALIUM   TAKE 1 TABLET BY MOUTH EVERY DAY AS NEEDED FOR MUSCLE SPASM      levothyroxine 25 MCG tablet  Commonly known as: SYNTHROID, LEVOTHROID   25 mcg, Nightly      metFORMIN 1000 MG tablet  Commonly known as: GLUCOPHAGE   1,000 mg, Daily      nitroglycerin 0.4 MG SL tablet  Commonly known as: NITROSTAT   0.4 mg, Sublingual, Every 5 Minutes PRN, Take no more than 3 doses in 15 minutes.                   **Dragon Disclaimer:   Much of this encounter note is an electronic transcription/translation of spoken language to printed text. The electronic translation of spoken language may permit erroneous, or at times, nonsensical words or phrases to be inadvertently transcribed. Although I have reviewed the note for such errors, some may still exist.

## 2025-04-18 NOTE — TELEPHONE ENCOUNTER
Caller: MURIEL    Relationship: SELF    Best call back number: [phone]        What was the call regarding: PT CALLED TO LET SEE AND DR. GATES KNOW HE WOULD LIKE TO DO CARDIAC REHAB IN [place] IF POSSIBLE.      HIS WIFE USED A FACILITY CALLED [facility] FOR HER KNEE(INFO BELOW) BUT UNSURE IF THEY DO CARDIAC REHAB THERE    [email or web address]  [facility]  [address], [place] [address] · [address]  [phone]

## 2025-04-18 NOTE — ASSESSMENT & PLAN NOTE
Denies angina or dyspnea  Patient made aware of need for uninterrupted DAPT for 1 year  Encouraged to participate in cardiac rehab.    The patient presents today for a 1 week post cardiac catheterization visit.  The following items were reviewed/discussed in clinic:    [x] Check Cath Site  [x] Medication Education for GDMT   [] N/A  [] Labs for new medications   [x] N/A  [x] Nutrition   [] N/A   [x] Exercise   [] N/A  [x] Cardiac Rehab   [] N/A  [] Return to Work   [] N/A

## 2025-04-21 ENCOUNTER — TELEPHONE (OUTPATIENT)
Dept: CARDIOLOGY | Age: 70
End: 2025-04-21
Payer: MEDICARE

## 2025-04-21 DIAGNOSIS — I25.10 CORONARY ARTERY DISEASE INVOLVING NATIVE CORONARY ARTERY OF NATIVE HEART WITHOUT ANGINA PECTORIS: Primary | ICD-10-CM

## 2025-04-21 NOTE — TELEPHONE ENCOUNTER
I had to amend it to an outgoing order since it's [employer] so it won't go automatically. It'll have to be printed and faxed.    Emdond key

## 2025-04-21 NOTE — TELEPHONE ENCOUNTER
Pt's wife called and would like the referral to be sent to [facility] cardiac rehab in [place], [place].  This has been requested in a different encounter.

## 2025-04-21 NOTE — TELEPHONE ENCOUNTER
Caller: SASCHA OLMOS    Relationship to patient: Emergency Contact    Best call back number: [phone]    Additional notes: PT'S WIFE IS CALLING TO SEE IF WE CAN SEND A REFERRAL FOR CARDIAC REHAB IN [place]. THEIR PHONE NUMBER [phone] AND THE FAX [phone]

## 2025-07-08 NOTE — PROGRESS NOTES
RM:________     PCP: Elder Almeida MD    : 1955  AGE: 70 y.o.  EST PATIENT           Wt Readings from Last 3 Encounters:   25 107 kg (236 lb 12.8 oz)   25 108 kg (238 lb)   25 108 kg (238 lb)           CP______  SOA_______ DIZZINESS _____ FATIGUE ______  PALPS ______    WT: ____________ BP: __________L __________R HR______    ALLERGIES:Patient has no known allergies.      Social History     Tobacco Use    Smoking status: Former     Current packs/day: 0.00     Types: Cigarettes     Quit date: 2010     Years since quittin.6     Passive exposure: Past    Smokeless tobacco: Never   Vaping Use    Vaping status: Never Used   Substance Use Topics    Alcohol use: Yes     Alcohol/week: 10.0 standard drinks of alcohol     Types: 10 Shots of liquor per week    Drug use: Yes     Types: Marijuana     Comment: twice weekly

## 2025-07-16 ENCOUNTER — OFFICE VISIT (OUTPATIENT)
Dept: CARDIOLOGY | Age: 70
End: 2025-07-16
Payer: MEDICARE

## 2025-07-16 VITALS
SYSTOLIC BLOOD PRESSURE: 118 MMHG | RESPIRATION RATE: 16 BRPM | HEART RATE: 67 BPM | BODY MASS INDEX: 32.67 KG/M2 | WEIGHT: 228.2 LBS | DIASTOLIC BLOOD PRESSURE: 72 MMHG | OXYGEN SATURATION: 96 % | HEIGHT: 70 IN

## 2025-07-16 DIAGNOSIS — I25.10 CORONARY ARTERY DISEASE INVOLVING NATIVE CORONARY ARTERY OF NATIVE HEART WITHOUT ANGINA PECTORIS: Primary | ICD-10-CM

## 2025-07-16 DIAGNOSIS — E78.2 MIXED HYPERLIPIDEMIA: ICD-10-CM

## 2025-07-16 PROBLEM — R94.39 ABNORMAL NUCLEAR STRESS TEST: Status: RESOLVED | Noted: 2025-04-04 | Resolved: 2025-07-16

## 2025-07-16 PROBLEM — R07.9 CHEST PAIN: Status: RESOLVED | Noted: 2025-04-11 | Resolved: 2025-07-16

## 2025-07-16 PROBLEM — R06.09 DYSPNEA ON EXERTION: Status: RESOLVED | Noted: 2025-04-04 | Resolved: 2025-07-16

## 2025-07-16 PROCEDURE — 99213 OFFICE O/P EST LOW 20 MIN: CPT | Performed by: INTERNAL MEDICINE

## 2025-07-16 PROCEDURE — 1160F RVW MEDS BY RX/DR IN RCRD: CPT | Performed by: INTERNAL MEDICINE

## 2025-07-16 PROCEDURE — 1159F MED LIST DOCD IN RCRD: CPT | Performed by: INTERNAL MEDICINE

## 2025-07-16 NOTE — PROGRESS NOTES
Date of Office Visit: 25  Encounter Provider: Kemal Guevara MD  Place of Service: [facility] CARDIOLOGY  Patient Name: Kashmir Moura  :1955    Chief Complaint   Patient presents with    Precordial pain    Coronary Artery Disease   :     HPI:     Mr. Moura is 70 y.o. and presents today in follow up. I have reviewed prior notes and there are no changes except for any new updates described below. I have also reviewed any information entered into the medical record by the patient or by ancillary staff.     He presented in May 2025 with fatigue, exertional fatigue, and chest pain. An echo was WNL but a perfusion stress was abnormal. He underwent coronary angiography:  *LAD: 50% prox, 95% distal (s/p 3.25x33, 3x48, 3x12mm CRIS)  *D1: 60% diffuse  *CX: 70-80% mid  RCA 40-50% prox    He has done very well. He is on DAPT with clopidogrel and denies bleeding. He's going to rehab in [place]. He denies angina or SOA. He denies LH or syncope.     Past Medical History:   Diagnosis Date    Anxiety     Depression     Incarcerated ventral hernia 2019    Malignant neoplasm of bladder 2014    Formatting of this note might be different from the original.  2015 IMO UPDATE      Seizures     Type 2 diabetes mellitus        Past Surgical History:   Procedure Laterality Date    CARDIAC CATHETERIZATION N/A 2025    Procedure: Coronary angiography;  Surgeon: Harshad Shipman MD;  Location: [facility] INVASIVE LOCATION;  Service: Cardiovascular;  Laterality: N/A;    CARDIAC CATHETERIZATION N/A 2025    Procedure: Left heart cath;  Surgeon: Harshad Shipman MD;  [facility] CATH [facility]VAS[facility];  Service: Cardiovascular;  Laterality: N/A;    CARDIAC CATHETERIZATION N/A 2025    Procedure: Percutaneous Coronary Intervention;  Surgeon: Harshad Shipman MD;  Location: [facility] CATH INVASIVE LOCATION;  Service: Cardiovascular;  Laterality: N/A;     CARDIAC CATHETERIZATION N/A 2025    Procedure: Stent CRIS coronary;  Surgeon: Harshad Shipman MD;  Location:  [facility] CATH INVASIVE LOCATION;  Service: Cardiovascular;  Laterality: N/A;    CARDIAC CATHETERIZATION N/A 2025    Procedure: Optical Coherence Tomography;  Surgeon: Harshad Shipman MD;  Location:  [facility] CATH INVASIVE LOCATION;  Service: Cardiovascular;  Laterality: N/A;    [facility]  [facility]     [place] POUCH      [place] [facility]matthew toney [facility] urology       Social History     Socioeconomic History    Marital status:    Tobacco Use    Smoking status: Former     Current packs/day: 0.00     Types: Cigarettes     Quit date: 2010     Years since quittin.6     Passive exposure: Past    Smokeless tobacco: Never   Vaping Use    Vaping status: Never Used   Substance and Sexual Activity    Alcohol use: Yes     Alcohol/week: 10.0 standard drinks of alcohol     Types: 10 Shots of liquor per week    Drug use: Yes     Types: Marijuana     Comment: twice weekly    Sexual activity: Yes     Partners: Female     Comment: Lives with wife, Shwetha       Family History   Problem Relation Age of Onset    Arthritis Mother     Lung cancer Father     Heart disease Father     Hypertension Father        ROS    No Known Allergies      Current Outpatient Medications:     aspirin 81 MG EC tablet, Take 1 tablet by mouth Daily., Disp: , Rfl:     atenolol (TENORMIN) 25 MG tablet, Take 1 tablet by mouth Daily., Disp: 90 tablet, Rfl: 1    atorvastatin (LIPITOR) 40 MG tablet, Take 1 tablet by mouth Every Night., Disp: 90 tablet, Rfl: 3    buPROPion XL (WELLBUTRIN XL) 150 MG 24 hr tablet, Take 2 tablets by mouth Daily., Disp: 30 tablet, Rfl: 1    clopidogrel (PLAVIX) 75 MG tablet, Take 1 tablet by mouth Daily., Disp: 90 tablet, Rfl: 11    diazePAM (VALIUM) 10 MG tablet, TAKE 1 TABLET BY MOUTH EVERY DAY AS NEEDED FOR MUSCLE SPASM, Disp: , Rfl:     levothyroxine  "(SYNTHROID, LEVOTHROID) 25 MCG tablet, Take 1 tablet by mouth Every Night., Disp: , Rfl:     metFORMIN (GLUCOPHAGE) 1000 MG tablet, Take 1 tablet by mouth Daily., Disp: , Rfl:     nitroglycerin (NITROSTAT) 0.4 MG SL tablet, Place 1 tablet under the tongue Every 5 (Five) Minutes As Needed for Chest Pain. Take no more than 3 doses in 15 minutes., Disp: 25 tablet, Rfl: 12    sertraline (ZOLOFT) 50 MG tablet, Take 1 tablet by mouth Daily., Disp: , Rfl:       Objective:     Vitals:    07/16/25 1144   BP: 118/72   BP Location: Left arm   Patient Position: Sitting   Cuff Size: Adult   Pulse: 67   Resp: 16   SpO2: 96%   Weight: 104 kg (228 lb 3.2 oz)   Height: 177.8 cm (70\")       Body mass index is 32.74 kg/m².    Vitals reviewed.   Constitutional:       Appearance: Not in distress.      Comments: Obese   Eyes:      Conjunctiva/sclera: Conjunctivae normal.   HENT:      Head: Normocephalic.      Nose: Nose normal.   Neck:      Vascular: JVD normal.   Pulmonary:      Effort: Pulmonary effort is normal.      Breath sounds: Normal breath sounds.   Cardiovascular:      Normal rate. Regular rhythm.      Murmurs: There is no murmur.   Pulses:     Intact distal pulses.   Edema:     Peripheral edema absent.   Abdominal:      Palpations: Abdomen is soft.      Tenderness: There is no abdominal tenderness.      Comments: Obesity limits abdominal exam   Musculoskeletal: Normal range of motion.      Cervical back: Normal range of motion. Skin:     General: Skin is warm and dry.   Neurological:      Mental Status: Alert and oriented to person, place, and time.      Cranial Nerves: No cranial nerve deficit.   Psychiatric:         Behavior: Behavior normal.         Thought Content: Thought content normal.         Judgment: Judgment normal.         Procedures    Assessment:       Diagnosis Plan   1. Coronary artery disease involving native coronary artery of native heart without angina pectoris        2. Mixed hyperlipidemia      "       Plan:       He is s/p CRIS x 3 to the LAD in May 2025. He has moderate disease elsewhere. He has normal LVSF. He denies angina. He'll remain on DAPT until February 2026 (9 months) then clopidogrel as monotherapy. He's on a statin. He's on atenolol to prevent angina from small vessel disease. He's going to cardiac rehab and really doing well.     Sincerely,       Kemal Guevara MD

## (undated) DEVICE — PK CATH CARD 40

## (undated) DEVICE — VBT GC 6F .070 XB 3 100CM: Brand: VISTA BRITE TIP

## (undated) DEVICE — TREK CORONARY DILATATION CATHETER 2.50 MM X 20 MM / RAPID-EXCHANGE: Brand: TREK

## (undated) DEVICE — DGW .035 FC J3MM 260CM TEF: Brand: EMERALD

## (undated) DEVICE — 6F .070 XB 3.5 ECO PK: Brand: VISTA BRITE TIP

## (undated) DEVICE — KT MANIFLD CARDIAC

## (undated) DEVICE — LOU PACE DEFIB: Brand: MEDLINE INDUSTRIES, INC.

## (undated) DEVICE — RUNTHROUGH NS EXTRA FLOPPY PTCA GUIDEWIRE: Brand: RUNTHROUGH

## (undated) DEVICE — DEV INDEFLATOR P/N 580289

## (undated) DEVICE — NC TREK NEO™ CORONARY DILATATION CATHETER 2.50 X 20 MM / RAPID-EXCHANGE: Brand: NC TREK NEO™

## (undated) DEVICE — FEMORAL ENTRY ANGIOGRAPHY SHIELD-YELLOW: Brand: RADPAD

## (undated) DEVICE — NC TREK NEO™ CORONARY DILATATION CATHETER 3.00 MM X 20 MM / RAPID-EXCHANGE: Brand: NC TREK NEO™

## (undated) DEVICE — TR BAND RADIAL ARTERY COMPRESSION DEVICE: Brand: TR BAND

## (undated) DEVICE — CATH DIAG IMPULSE FL3.5 5F 100CM

## (undated) DEVICE — KT CATH IMG DRAGONFLY/OPSTAR 2.7F 135CM

## (undated) DEVICE — NC TREK NEO™ CORONARY DILATATION CATHETER 3.25 MM X 20 MM / RAPID-EXCHANGE: Brand: NC TREK NEO™

## (undated) DEVICE — GLIDESHEATH SLENDER STAINLESS STEEL KIT: Brand: GLIDESHEATH SLENDER

## (undated) DEVICE — CATH DIAG IMPULSE FR5 5F 100CM